# Patient Record
Sex: FEMALE | Race: WHITE | NOT HISPANIC OR LATINO | Employment: PART TIME | URBAN - METROPOLITAN AREA
[De-identification: names, ages, dates, MRNs, and addresses within clinical notes are randomized per-mention and may not be internally consistent; named-entity substitution may affect disease eponyms.]

---

## 2019-01-28 ENCOUNTER — TRANSCRIBE ORDERS (OUTPATIENT)
Dept: ADMINISTRATIVE | Facility: HOSPITAL | Age: 29
End: 2019-01-28

## 2019-01-28 ENCOUNTER — APPOINTMENT (OUTPATIENT)
Dept: LAB | Facility: HOSPITAL | Age: 29
End: 2019-01-28
Attending: PLASTIC SURGERY
Payer: COMMERCIAL

## 2019-01-28 DIAGNOSIS — Z01.818 PREOP EXAMINATION: Primary | ICD-10-CM

## 2019-01-28 DIAGNOSIS — Z01.818 PREOP EXAMINATION: ICD-10-CM

## 2019-01-28 LAB
APTT PPP: 29 SECONDS (ref 23–34)
BASOPHILS # BLD AUTO: 0 THOUSANDS/ΜL (ref 0–0.1)
BASOPHILS NFR BLD AUTO: 1 % (ref 0–1)
EOSINOPHIL # BLD AUTO: 0.1 THOUSAND/ΜL (ref 0–0.4)
EOSINOPHIL NFR BLD AUTO: 2 % (ref 0–6)
ERYTHROCYTE [DISTWIDTH] IN BLOOD BY AUTOMATED COUNT: 14.8 %
HCT VFR BLD AUTO: 36.9 % (ref 36–46)
HGB BLD-MCNC: 12.3 G/DL (ref 12–16)
INR PPP: 1.08 (ref 0.89–1.1)
LYMPHOCYTES # BLD AUTO: 2.7 THOUSANDS/ΜL (ref 0.5–4)
LYMPHOCYTES NFR BLD AUTO: 46 % (ref 25–45)
MCH RBC QN AUTO: 29.9 PG (ref 26–34)
MCHC RBC AUTO-ENTMCNC: 33.2 G/DL (ref 31–36)
MCV RBC AUTO: 90 FL (ref 80–100)
MONOCYTES # BLD AUTO: 0.5 THOUSAND/ΜL (ref 0.2–0.9)
MONOCYTES NFR BLD AUTO: 9 % (ref 1–10)
NEUTROPHILS # BLD AUTO: 2.5 THOUSANDS/ΜL (ref 1.8–7.8)
NEUTS SEG NFR BLD AUTO: 43 % (ref 45–65)
PLATELET # BLD AUTO: 184 THOUSANDS/UL (ref 150–450)
PMV BLD AUTO: 9 FL (ref 8.9–12.7)
PROTHROMBIN TIME: 11.4 SECONDS (ref 9.5–11.6)
RBC # BLD AUTO: 4.1 MILLION/UL (ref 4–5.2)
WBC # BLD AUTO: 5.9 THOUSAND/UL (ref 4.5–11)

## 2019-01-28 PROCEDURE — 36415 COLL VENOUS BLD VENIPUNCTURE: CPT

## 2019-01-28 PROCEDURE — 85025 COMPLETE CBC W/AUTO DIFF WBC: CPT

## 2019-01-28 PROCEDURE — 85730 THROMBOPLASTIN TIME PARTIAL: CPT

## 2019-01-28 PROCEDURE — 85610 PROTHROMBIN TIME: CPT

## 2019-02-08 RX ORDER — BUPROPION HYDROCHLORIDE 150 MG/1
150 TABLET, EXTENDED RELEASE ORAL 2 TIMES DAILY
COMMUNITY

## 2019-02-08 NOTE — PRE-PROCEDURE INSTRUCTIONS
Pre-Surgery Instructions:   Medication Instructions    buPROPion (WELLBUTRIN SR) 150 mg 12 hr tablet Instructed patient per Anesthesia Guidelines  Pre-op Showering Instructions for Surgery Patients    Before your operation, you play an important role in decreasing your risk for infection by washing with special antiseptic soap  This is an effective way to reduce bacteria on the skin which may help to prevent infections at the surgical site  Please read the following directions in advance  1  In the week before your operation, purchase a 4 ounce bottle of antiseptic soap containing chlorhexidine gluconate (CHG)  4%  Some brand names include: Aplicare®, Endure, and Hibiclens®  The cost is usually less than $5 00   For your convenience, the [email or web address] carries the soap   It may also be available at your doctors office or pre-admission testing center, and at most retail pharmacies   If you are allergic or sensitive to soaps containing CHG, please let your doctor know so another antiseptic can be suggested   CHG antiseptic soap is for external use only  2  The day before your operation, follow these instructions carefully to get ready   Please clean linens (sheets) on your bed; you should sleep on clean sheets after your evening shower   Get clean towels and washcloth ready - you need enough for 2 showers   Set aside clean underwear, pajamas, and clothing to wear after the showers     Reminders:   DO NOT use any other soap or body rinse on your skin during or after the antiseptic showers   DO NOT use lotion, powder, deodorant, or perfume/aftershave of any kind on your skin after your antiseptic shower   DO NOT shave any body parts in the 24 hours/day before your operation   DO NOT get the antiseptic soap in your eyes, ears, nose, mouth, or vaginal area    3   You will need to shower the night before AND the morning of your surgery  Shower 1:   The first evening before the operation, take the first shower   First, shampoo your hair with regular shampoo and rinse it completely before you use the antiseptic soap  After washing and rinsing your hair, rinse your body   Next, use a clean washcloth to apply the antiseptic soap and wash your body from the neck down to your toes using ½ bottle of the antiseptic soap   You will use the other ½ bottle for the second shower   Clean the area where your incision will be; lather this area well for about 2 minutes   If you are having head or neck surgery, wash areas with the antiseptic soap   Rinse yourself completely with running water   Use a clean towel to dry off   Wear clean underwear and clothing/pajamas  Shower 2   The morning of your operation, take the second shower following the same steps as Shower 1 using the second ½ of the bottle of antiseptic soap   Use clean cloths and towels to wash and dry yourself   Wear clean underwear and clothing

## 2019-02-08 NOTE — H&P
H&P Exam - Lester Cancer 34 y o  female MRN: [id number]    Unit/Bed#:  Encounter: [id number]    Assessment:  Lipodystrophy of the abdomen    Plan:  Abdominal plasty    History of Present Illness   This is a 81-year-old female has redundant skin of the abdomen and rectus diastasis patient also has tattoos of her abdomen patient understands had partial tattoo removal will be done with removal of the pannus    Review of Systems   All other systems reviewed and are negative  Historical Information   No past medical history on file  No past surgical history on file    Social History   History   Alcohol use Not on file     History   Drug use: Unknown     History   Smoking Status    Not on file   Smokeless Tobacco    Not on file     Family History: non-contributory    Meds/Allergies   all medications and allergies reviewed  Allergies not on file    Objective   First Vitals:        Current Vitals:        No intake or output data in the 24 hours ending 02/07/19 1905    Invasive Devices          No matching active lines, drains, or airways          Physical Exam examination of the head ears eyes nose and throat is within normal limits heart sounds S1-S2 are heard no murmurs or gallops lungs clear abdomen soft extremities are within normal limits    Lab Results:   Imaging:   EKG, Pathology, and Other Studies:     Code Status: No Order  Advance Directive and Living Will:      Power of :    POLST:      Counseling / Coordination of Care:   None

## 2019-02-11 ENCOUNTER — ANESTHESIA EVENT (OUTPATIENT)
Dept: PERIOP | Facility: HOSPITAL | Age: 29
End: 2019-02-11
Payer: SELF-PAY

## 2019-02-11 NOTE — ANESTHESIA PREPROCEDURE EVALUATION
Review of Systems/Medical History  Patient summary reviewed  Chart reviewed  No history of anesthetic complications     Cardiovascular  Negative cardio ROS Exercise tolerance (METS): >4,     Pulmonary  Not a smoker , No asthma , No sleep apnea ,        GI/Hepatic  Negative GI/hepatic ROS          Negative  ROS        Endo/Other  Negative endo/other ROS      GYN  Not currently pregnant , Prior pregnancy/OB history : 3 Parity: 3,          Hematology  Negative hematology ROS      Musculoskeletal  Negative musculoskeletal ROS        Neurology  Negative neurology ROS      Psychology   Negative psychology ROS Depression , being treated for depression,              Physical Exam    Airway    Mallampati score: II         Dental       Cardiovascular  Comment: Negative ROS, Cardiovascular exam normal    Pulmonary  Pulmonary exam normal     Other Findings  Fixed upper and lower teeth and in good repair      Anesthesia Plan  ASA Score- 2     Anesthesia Type- general with ASA Monitors  Additional Monitors:   Airway Plan:         Plan Factors-Patient not instructed to abstain from smoking on day of procedure  Patient did not smoke on day of surgery  Induction- intravenous  Postoperative Plan- Plan for postoperative opioid use  Informed Consent- Anesthetic plan and risks discussed with patient and spouse  I personally reviewed this patient with the CRNA  Discussed and agreed on the Anesthesia Plan with the CRNA  Rah Leung

## 2019-02-12 ENCOUNTER — ANESTHESIA (OUTPATIENT)
Dept: PERIOP | Facility: HOSPITAL | Age: 29
End: 2019-02-12
Payer: SELF-PAY

## 2019-02-12 ENCOUNTER — HOSPITAL ENCOUNTER (OUTPATIENT)
Facility: HOSPITAL | Age: 29
Setting detail: OBSERVATION
Discharge: HOME | End: 2019-02-13
Attending: PLASTIC SURGERY | Admitting: PLASTIC SURGERY
Payer: SELF-PAY

## 2019-02-12 DIAGNOSIS — E88.1 LIPODYSTROPHY: Primary | ICD-10-CM

## 2019-02-12 DIAGNOSIS — Z41.1 ENCOUNTER FOR COSMETIC SURGERY: ICD-10-CM

## 2019-02-12 LAB
BILIRUB UR QL STRIP: NEGATIVE
CLARITY UR: CLEAR
COLOR UR: NORMAL
EXT PREGNANCY TEST URINE: NEGATIVE
GLUCOSE UR STRIP-MCNC: NEGATIVE MG/DL
HGB UR QL STRIP.AUTO: NEGATIVE
KETONES UR STRIP-MCNC: NEGATIVE MG/DL
LEUKOCYTE ESTERASE UR QL STRIP: NEGATIVE
NITRITE UR QL STRIP: NEGATIVE
PH UR STRIP.AUTO: 6.5 [PH] (ref 4.5–8)
PROT UR STRIP-MCNC: NEGATIVE MG/DL
SP GR UR STRIP.AUTO: 1.01 (ref 1–1.04)
UROBILINOGEN UA: NEGATIVE MG/DL

## 2019-02-12 PROCEDURE — 81025 URINE PREGNANCY TEST: CPT | Performed by: PLASTIC SURGERY

## 2019-02-12 RX ORDER — ONDANSETRON 2 MG/ML
4 INJECTION INTRAMUSCULAR; INTRAVENOUS EVERY 8 HOURS PRN
Status: DISCONTINUED | OUTPATIENT
Start: 2019-02-12 | End: 2019-02-12

## 2019-02-12 RX ORDER — LIDOCAINE HYDROCHLORIDE 10 MG/ML
INJECTION, SOLUTION INFILTRATION; PERINEURAL AS NEEDED
Status: DISCONTINUED | OUTPATIENT
Start: 2019-02-12 | End: 2019-02-12 | Stop reason: SURG

## 2019-02-12 RX ORDER — FENTANYL CITRATE/PF 50 MCG/ML
25 SYRINGE (ML) INJECTION
Status: DISCONTINUED | OUTPATIENT
Start: 2019-02-12 | End: 2019-02-12 | Stop reason: HOSPADM

## 2019-02-12 RX ORDER — PROMETHAZINE HYDROCHLORIDE 25 MG/ML
25 INJECTION, SOLUTION INTRAMUSCULAR; INTRAVENOUS ONCE AS NEEDED
Status: DISCONTINUED | OUTPATIENT
Start: 2019-02-12 | End: 2019-02-12 | Stop reason: HOSPADM

## 2019-02-12 RX ORDER — DEXAMETHASONE SODIUM PHOSPHATE 4 MG/ML
INJECTION, SOLUTION INTRA-ARTICULAR; INTRALESIONAL; INTRAMUSCULAR; INTRAVENOUS; SOFT TISSUE AS NEEDED
Status: DISCONTINUED | OUTPATIENT
Start: 2019-02-12 | End: 2019-02-12 | Stop reason: SURG

## 2019-02-12 RX ORDER — FENTANYL CITRATE 50 UG/ML
INJECTION, SOLUTION INTRAMUSCULAR; INTRAVENOUS AS NEEDED
Status: DISCONTINUED | OUTPATIENT
Start: 2019-02-12 | End: 2019-02-12 | Stop reason: SURG

## 2019-02-12 RX ORDER — ONDANSETRON 2 MG/ML
4 INJECTION INTRAMUSCULAR; INTRAVENOUS EVERY 6 HOURS PRN
Status: DISCONTINUED | OUTPATIENT
Start: 2019-02-12 | End: 2019-02-13 | Stop reason: HOSPADM

## 2019-02-12 RX ORDER — CEFAZOLIN SODIUM 1 G/50ML
SOLUTION INTRAVENOUS AS NEEDED
Status: DISCONTINUED | OUTPATIENT
Start: 2019-02-12 | End: 2019-02-12 | Stop reason: SURG

## 2019-02-12 RX ORDER — HYDROMORPHONE HCL/PF 1 MG/ML
0.5 SYRINGE (ML) INJECTION EVERY 2 HOUR PRN
Status: DISCONTINUED | OUTPATIENT
Start: 2019-02-12 | End: 2019-02-13 | Stop reason: HOSPADM

## 2019-02-12 RX ORDER — ROCURONIUM BROMIDE 10 MG/ML
INJECTION, SOLUTION INTRAVENOUS AS NEEDED
Status: DISCONTINUED | OUTPATIENT
Start: 2019-02-12 | End: 2019-02-12 | Stop reason: SURG

## 2019-02-12 RX ORDER — LIDOCAINE HYDROCHLORIDE AND EPINEPHRINE 5; 5 MG/ML; UG/ML
INJECTION, SOLUTION INFILTRATION; PERINEURAL AS NEEDED
Status: DISCONTINUED | OUTPATIENT
Start: 2019-02-12 | End: 2019-02-12 | Stop reason: HOSPADM

## 2019-02-12 RX ORDER — CEFAZOLIN SODIUM 1 G/50ML
1000 SOLUTION INTRAVENOUS ONCE
Status: DISCONTINUED | OUTPATIENT
Start: 2019-02-12 | End: 2019-02-12 | Stop reason: HOSPADM

## 2019-02-12 RX ORDER — HYDROMORPHONE HCL/PF 1 MG/ML
0.5 SYRINGE (ML) INJECTION
Status: DISCONTINUED | OUTPATIENT
Start: 2019-02-12 | End: 2019-02-12 | Stop reason: HOSPADM

## 2019-02-12 RX ORDER — DEXTROSE MONOHYDRATE AND SODIUM CHLORIDE 5; .45 G/100ML; G/100ML
125 INJECTION, SOLUTION INTRAVENOUS CONTINUOUS
Status: DISCONTINUED | OUTPATIENT
Start: 2019-02-12 | End: 2019-02-13 | Stop reason: HOSPADM

## 2019-02-12 RX ORDER — CEFAZOLIN SODIUM 1 G/50ML
1000 SOLUTION INTRAVENOUS EVERY 8 HOURS
Status: DISCONTINUED | OUTPATIENT
Start: 2019-02-12 | End: 2019-02-12

## 2019-02-12 RX ORDER — ONDANSETRON 2 MG/ML
INJECTION INTRAMUSCULAR; INTRAVENOUS AS NEEDED
Status: DISCONTINUED | OUTPATIENT
Start: 2019-02-12 | End: 2019-02-12 | Stop reason: SURG

## 2019-02-12 RX ORDER — SODIUM CHLORIDE, SODIUM LACTATE, POTASSIUM CHLORIDE, CALCIUM CHLORIDE 600; 310; 30; 20 MG/100ML; MG/100ML; MG/100ML; MG/100ML
75 INJECTION, SOLUTION INTRAVENOUS CONTINUOUS
Status: DISCONTINUED | OUTPATIENT
Start: 2019-02-12 | End: 2019-02-13 | Stop reason: HOSPADM

## 2019-02-12 RX ORDER — MAGNESIUM HYDROXIDE 1200 MG/15ML
LIQUID ORAL AS NEEDED
Status: DISCONTINUED | OUTPATIENT
Start: 2019-02-12 | End: 2019-02-12 | Stop reason: HOSPADM

## 2019-02-12 RX ORDER — PROPOFOL 10 MG/ML
INJECTION, EMULSION INTRAVENOUS AS NEEDED
Status: DISCONTINUED | OUTPATIENT
Start: 2019-02-12 | End: 2019-02-12 | Stop reason: SURG

## 2019-02-12 RX ORDER — ONDANSETRON 2 MG/ML
4 INJECTION INTRAMUSCULAR; INTRAVENOUS ONCE AS NEEDED
Status: COMPLETED | OUTPATIENT
Start: 2019-02-12 | End: 2019-02-12

## 2019-02-12 RX ORDER — METOCLOPRAMIDE HYDROCHLORIDE 5 MG/ML
10 INJECTION INTRAMUSCULAR; INTRAVENOUS ONCE
Status: COMPLETED | OUTPATIENT
Start: 2019-02-12 | End: 2019-02-12

## 2019-02-12 RX ORDER — MIDAZOLAM HYDROCHLORIDE 1 MG/ML
INJECTION INTRAMUSCULAR; INTRAVENOUS AS NEEDED
Status: DISCONTINUED | OUTPATIENT
Start: 2019-02-12 | End: 2019-02-12 | Stop reason: SURG

## 2019-02-12 RX ORDER — DIPHENHYDRAMINE HYDROCHLORIDE 50 MG/ML
25 INJECTION INTRAMUSCULAR; INTRAVENOUS EVERY 6 HOURS PRN
Status: DISCONTINUED | OUTPATIENT
Start: 2019-02-12 | End: 2019-02-13 | Stop reason: HOSPADM

## 2019-02-12 RX ORDER — NEOSTIGMINE METHYLSULFATE 1 MG/ML
INJECTION INTRAVENOUS AS NEEDED
Status: DISCONTINUED | OUTPATIENT
Start: 2019-02-12 | End: 2019-02-12 | Stop reason: SURG

## 2019-02-12 RX ORDER — ONDANSETRON 2 MG/ML
4 INJECTION INTRAMUSCULAR; INTRAVENOUS ONCE AS NEEDED
Status: DISCONTINUED | OUTPATIENT
Start: 2019-02-12 | End: 2019-02-12 | Stop reason: HOSPADM

## 2019-02-12 RX ORDER — SODIUM CHLORIDE, SODIUM LACTATE, POTASSIUM CHLORIDE, CALCIUM CHLORIDE 600; 310; 30; 20 MG/100ML; MG/100ML; MG/100ML; MG/100ML
125 INJECTION, SOLUTION INTRAVENOUS CONTINUOUS
Status: CANCELLED | OUTPATIENT
Start: 2019-02-12

## 2019-02-12 RX ORDER — OXYCODONE HYDROCHLORIDE AND ACETAMINOPHEN 5; 325 MG/1; MG/1
1 TABLET ORAL EVERY 4 HOURS PRN
Status: DISCONTINUED | OUTPATIENT
Start: 2019-02-12 | End: 2019-02-13 | Stop reason: HOSPADM

## 2019-02-12 RX ORDER — SODIUM CHLORIDE, SODIUM LACTATE, POTASSIUM CHLORIDE, CALCIUM CHLORIDE 600; 310; 30; 20 MG/100ML; MG/100ML; MG/100ML; MG/100ML
125 INJECTION, SOLUTION INTRAVENOUS CONTINUOUS
Status: DISCONTINUED | OUTPATIENT
Start: 2019-02-12 | End: 2019-02-12

## 2019-02-12 RX ORDER — CEFAZOLIN SODIUM 1 G/50ML
1000 SOLUTION INTRAVENOUS EVERY 8 HOURS
Status: COMPLETED | OUTPATIENT
Start: 2019-02-12 | End: 2019-02-13

## 2019-02-12 RX ORDER — HYDROMORPHONE HCL 110MG/55ML
PATIENT CONTROLLED ANALGESIA SYRINGE INTRAVENOUS AS NEEDED
Status: DISCONTINUED | OUTPATIENT
Start: 2019-02-12 | End: 2019-02-12 | Stop reason: SURG

## 2019-02-12 RX ORDER — GLYCOPYRROLATE 0.2 MG/ML
INJECTION INTRAMUSCULAR; INTRAVENOUS AS NEEDED
Status: DISCONTINUED | OUTPATIENT
Start: 2019-02-12 | End: 2019-02-12 | Stop reason: SURG

## 2019-02-12 RX ADMIN — FENTANYL CITRATE 50 MCG: 50 INJECTION INTRAMUSCULAR; INTRAVENOUS at 11:23

## 2019-02-12 RX ADMIN — LIDOCAINE HYDROCHLORIDE 50 MG: 10 INJECTION, SOLUTION INFILTRATION; PERINEURAL at 08:11

## 2019-02-12 RX ADMIN — PROPOFOL 200 MG: 10 INJECTION, EMULSION INTRAVENOUS at 08:11

## 2019-02-12 RX ADMIN — MIDAZOLAM HYDROCHLORIDE 2 MG: 1 INJECTION, SOLUTION INTRAMUSCULAR; INTRAVENOUS at 08:06

## 2019-02-12 RX ADMIN — FENTANYL CITRATE 100 MCG: 50 INJECTION INTRAMUSCULAR; INTRAVENOUS at 08:11

## 2019-02-12 RX ADMIN — ROCURONIUM BROMIDE 10 MG: 10 INJECTION INTRAVENOUS at 09:34

## 2019-02-12 RX ADMIN — DEXTROSE AND SODIUM CHLORIDE 125 ML/HR: 5; 450 INJECTION, SOLUTION INTRAVENOUS at 21:28

## 2019-02-12 RX ADMIN — OXYCODONE HYDROCHLORIDE AND ACETAMINOPHEN 1 TABLET: 5; 325 TABLET ORAL at 23:44

## 2019-02-12 RX ADMIN — ROCURONIUM BROMIDE 10 MG: 10 INJECTION INTRAVENOUS at 09:46

## 2019-02-12 RX ADMIN — HYDROMORPHONE HYDROCHLORIDE 1 MG: 2 INJECTION, SOLUTION INTRAMUSCULAR; INTRAVENOUS; SUBCUTANEOUS at 11:55

## 2019-02-12 RX ADMIN — ROCURONIUM BROMIDE 50 MG: 10 INJECTION INTRAVENOUS at 08:11

## 2019-02-12 RX ADMIN — LIDOCAINE HYDROCHLORIDE 1 APPLICATION: 20 JELLY TOPICAL at 08:14

## 2019-02-12 RX ADMIN — FENTANYL CITRATE 50 MCG: 50 INJECTION INTRAMUSCULAR; INTRAVENOUS at 08:06

## 2019-02-12 RX ADMIN — SODIUM CHLORIDE, SODIUM LACTATE, POTASSIUM CHLORIDE, AND CALCIUM CHLORIDE: .6; .31; .03; .02 INJECTION, SOLUTION INTRAVENOUS at 07:45

## 2019-02-12 RX ADMIN — METOCLOPRAMIDE 10 MG: 5 INJECTION, SOLUTION INTRAMUSCULAR; INTRAVENOUS at 13:02

## 2019-02-12 RX ADMIN — ROCURONIUM BROMIDE 10 MG: 10 INJECTION INTRAVENOUS at 10:32

## 2019-02-12 RX ADMIN — CEFAZOLIN SODIUM 1000 MG: 1 SOLUTION INTRAVENOUS at 17:27

## 2019-02-12 RX ADMIN — ONDANSETRON HYDROCHLORIDE 4 MG: 2 INJECTION, SOLUTION INTRAMUSCULAR; INTRAVENOUS at 18:39

## 2019-02-12 RX ADMIN — FENTANYL CITRATE 50 MCG: 50 INJECTION INTRAMUSCULAR; INTRAVENOUS at 10:28

## 2019-02-12 RX ADMIN — DEXAMETHASONE SODIUM PHOSPHATE 4 MG: 4 INJECTION, SOLUTION INTRA-ARTICULAR; INTRALESIONAL; INTRAMUSCULAR; INTRAVENOUS; SOFT TISSUE at 08:45

## 2019-02-12 RX ADMIN — SODIUM CHLORIDE, SODIUM LACTATE, POTASSIUM CHLORIDE, AND CALCIUM CHLORIDE: .6; .31; .03; .02 INJECTION, SOLUTION INTRAVENOUS at 10:00

## 2019-02-12 RX ADMIN — ONDANSETRON HYDROCHLORIDE 4 MG: 2 INJECTION, SOLUTION INTRAMUSCULAR; INTRAVENOUS at 23:40

## 2019-02-12 RX ADMIN — ROCURONIUM BROMIDE 10 MG: 10 INJECTION INTRAVENOUS at 09:54

## 2019-02-12 RX ADMIN — ONDANSETRON 4 MG: 2 INJECTION INTRAMUSCULAR; INTRAVENOUS at 13:01

## 2019-02-12 RX ADMIN — CEFAZOLIN SODIUM 1000 MG: 1 SOLUTION INTRAVENOUS at 08:28

## 2019-02-12 RX ADMIN — NEOSTIGMINE METHYLSULFATE 4 MG: 1 INJECTION INTRAVENOUS at 11:45

## 2019-02-12 RX ADMIN — DEXTROSE AND SODIUM CHLORIDE 125 ML/HR: 5; 450 INJECTION, SOLUTION INTRAVENOUS at 12:39

## 2019-02-12 RX ADMIN — HYDROMORPHONE HYDROCHLORIDE 1 MG: 2 INJECTION, SOLUTION INTRAMUSCULAR; INTRAVENOUS; SUBCUTANEOUS at 11:38

## 2019-02-12 RX ADMIN — GLYCOPYRROLATE 0.4 MG: 0.2 INJECTION, SOLUTION INTRAMUSCULAR; INTRAVENOUS at 11:45

## 2019-02-12 RX ADMIN — ONDANSETRON HYDROCHLORIDE 4 MG: 2 INJECTION, SOLUTION INTRAMUSCULAR; INTRAVENOUS at 11:40

## 2019-02-12 RX ADMIN — DIPHENHYDRAMINE HYDROCHLORIDE 25 MG: 50 INJECTION INTRAMUSCULAR; INTRAVENOUS at 23:40

## 2019-02-12 RX ADMIN — DIPHENHYDRAMINE HYDROCHLORIDE 25 MG: 50 INJECTION INTRAMUSCULAR; INTRAVENOUS at 17:26

## 2019-02-12 NOTE — NURSING NOTE
Surgical garment put in place properly  Garment was put on with clips against patient's skin  Garment changed to inside out and put in place with help of GURVINDER Serra

## 2019-02-12 NOTE — NURSING NOTE
Patient received from PACU into 513  Patient is drowsy but responds to name and is orientated when woken up  No complaints of pain  Mild ABD tenderness with palpation  +BS  No shortness of breath, no chest pain  Lungs CTA  Abdominal binder in place  IVF infusing  SCDs in place  Call bell in reach, will monitor

## 2019-02-12 NOTE — OP NOTE
OPERATIVE REPORT  PATIENT NAME: Marcus Lepe    :  1990  MRN: [id number]  Pt Location:  OR ROOM 03    SURGERY DATE: 2019    Surgeon(s) and Role:     * Teresita Mcknight MD - Primary     * GUSTAVO Natarajan - Assisting    Preop Diagnosis:  Encounter for cosmetic surgery [Z41 1]    Post-Op Diagnosis Codes:     * Encounter for cosmetic surgery [Z41 1]    Procedure(s) (LRB):  ABDOMINOPLASTY (N/A)    Specimen(s):  ID Type Source Tests Collected by Time Destination   A :  Urine Urine, Indwelling Biswas Catheter URINALYSIS WITH REFLEX TO MICROSCOPIC Teresita Mcknight MD 2019 1024        Estimated Blood Loss:   Minimal    Drains:  Closed/Suction Drain Right;Lateral Abdomen Bulb 10 Fr  (Active)   Number of days: 0       Urethral Catheter Latex 16 Fr  (Active)   Number of days: 0       Anesthesia Type:   General    Operative Indications:  Encounter for cosmetic surgery [Z41 1]  Excessive abdominal fat of abdomen    Operative Findings:  Normal    Complications:   None    Procedure and Technique:  Patient was marked in the holding area draped and prepped in the normal sterile fashion after general endotracheal anesthesia tumescent liposuction was used to contour the waist in the premarked area was excised this was undermined up to the rib margin midline was plicated with 0 Tycron sutures in a figure-eight fashion the excess abdominal pannus was removed and 10   Channel drain was placed through a separate stab wound in the flank anchored in place with 3-0 nylon suture the umbilicus was transposed to the midline anchored in place with 4-0 Vicryl buried sutures and half buried 5 0 nylon mattress sutures the flap was then approximated with oa Tycron sutures for fascia with 2-0 Polysorb for deep dermis and a running subcuticular 3-0 Surgipro suture for skin abdominal compression garment was placed   I was present for the entire procedure    Patient Disposition:  PACU     SIGNATURE: Teresita Mcknight MD  DATE: February 12, 2019  TIME: 11:50 AM

## 2019-02-12 NOTE — PLAN OF CARE
Problem: Prexisting or High Potential for Compromised Skin Integrity  Goal: Skin integrity is maintained or improved  Description  INTERVENTIONS:  - Identify patients at risk for skin breakdown  - Assess and monitor skin integrity  - Assess and monitor nutrition and hydration status  - Monitor labs (i e  albumin)  - Assess for incontinence   - Turn and reposition patient  - Assist with mobility/ambulation  - Relieve pressure over bony prominences  - Avoid friction and shearing  - Provide appropriate hygiene as needed including keeping skin clean and dry  - Evaluate need for skin moisturizer/barrier cream  - Collaborate with interdisciplinary team (i e  Nutrition, Rehabilitation, etc )   - Patient/family teaching  Outcome: Progressing     Problem: PAIN - ADULT  Goal: Verbalizes/displays adequate comfort level or baseline comfort level  Description  Interventions:  - Encourage patient to monitor pain and request assistance  - Assess pain using appropriate pain scale  - Administer analgesics based on type and severity of pain and evaluate response  - Implement non-pharmacological measures as appropriate and evaluate response  - Consider cultural and social influences on pain and pain management  - Notify physician/advanced practitioner if interventions unsuccessful or patient reports new pain  Outcome: Progressing     Problem: INFECTION - ADULT  Goal: Absence or prevention of progression during hospitalization  Description  INTERVENTIONS:  - Assess and monitor for signs and symptoms of infection  - Monitor lab/diagnostic results  - Monitor all insertion sites, i e  indwelling lines, tubes, and drains  - Monitor endotracheal (as able) and nasal secretions for changes in amount and color  - [place] appropriate cooling/warming therapies per order  - Administer medications as ordered  - Instruct and encourage patient and family to use good hand hygiene technique  - Identify and instruct in appropriate isolation precautions for identified infection/condition  Outcome: Progressing  Goal: Absence of fever/infection during neutropenic period  Description  INTERVENTIONS:  - Monitor WBC  - Implement neutropenic guidelines  Outcome: Progressing     Problem: SAFETY ADULT  Goal: Patient will remain free of falls  Description  INTERVENTIONS:  - Assess patient frequently for physical needs  -  Identify cognitive and physical deficits and behaviors that affect risk of falls    -  [place] fall precautions as indicated by assessment   - Educate patient/family on patient safety including physical limitations  - Instruct patient to call for assistance with activity based on assessment  - Modify environment to reduce risk of injury  - Consider OT/PT consult to assist with strengthening/mobility  Outcome: Progressing  Goal: Maintain or return to baseline ADL function  Description  INTERVENTIONS:  -  Assess patient's ability to carry out ADLs; assess patient's baseline for ADL function and identify physical deficits which impact ability to perform ADLs (bathing, care of mouth/teeth, toileting, grooming, dressing, etc )  - Assess/evaluate cause of self-care deficits   - Assess range of motion  - Assess patient's mobility; develop plan if impaired  - Assess patient's need for assistive devices and provide as appropriate  - Encourage maximum independence but intervene and supervise when necessary  ¯ Involve family in performance of ADLs  ¯ Assess for home care needs following discharge   ¯ Request OT consult to assist with ADL evaluation and planning for discharge  ¯ Provide patient education as appropriate  Outcome: Progressing  Goal: Maintain or return mobility status to optimal level  Description  INTERVENTIONS:  - Assess patient's baseline mobility status (ambulation, transfers, stairs, etc )    - Identify cognitive and physical deficits and behaviors that affect mobility  - Identify mobility aids required to assist with transfers and/or ambulation (gait belt, sit-to-stand, lift, walker, cane, etc )  - [place] fall precautions as indicated by assessment  - Record patient progress and toleration of activity level on Mobility SBAR; progress patient to next Phase/Stage  - Instruct patient to call for assistance with activity based on assessment  - Request Rehabilitation consult to assist with strengthening/weightbearing, etc   Outcome: Progressing     Problem: DISCHARGE PLANNING  Goal: Discharge to home or other facility with appropriate resources  Description  INTERVENTIONS:  - Identify barriers to discharge w/patient and caregiver  - Arrange for needed discharge resources and transportation as appropriate  - Identify discharge learning needs (meds, wound care, etc )  - Arrange for interpretive services to assist at discharge as needed  - Refer to Case Management Department for coordinating discharge planning if the patient needs post-hospital services based on physician/advanced practitioner order or complex needs related to functional status, cognitive ability, or social support system  Outcome: Progressing     Problem: Knowledge Deficit  Goal: Patient/family/caregiver demonstrates understanding of disease process, treatment plan, medications, and discharge instructions  Description  Complete learning assessment and assess knowledge base    Interventions:  - Provide teaching at level of understanding  - Provide teaching via preferred learning methods  Outcome: Progressing

## 2019-02-12 NOTE — PERIOPERATIVE NURSING NOTE
Nauseated earlier, scant amount of vicious, clear, pink tinged mucous expectorated  Pain in abd only with heaving motion  Cushion provided for abd  Sleeping quietly at this time  Will continue to monitor

## 2019-02-12 NOTE — ANESTHESIA POSTPROCEDURE EVALUATION
Post-Op Assessment Note    CV Status:  Stable    Pain management: adequate     Mental Status:  Alert and awake   Hydration Status:  Euvolemic   PONV Controlled:  Controlled   Airway Patency:  Patent   Post Op Vitals Reviewed: Yes      Staff: CRNA           BP      Temp      Pulse     Resp      SpO2

## 2019-02-13 VITALS
TEMPERATURE: 98.4 F | OXYGEN SATURATION: 98 % | RESPIRATION RATE: 16 BRPM | DIASTOLIC BLOOD PRESSURE: 65 MMHG | HEART RATE: 80 BPM | SYSTOLIC BLOOD PRESSURE: 108 MMHG | BODY MASS INDEX: 25.52 KG/M2 | WEIGHT: 130 LBS | HEIGHT: 60 IN

## 2019-02-13 PROBLEM — E88.1 LIPODYSTROPHY: Status: ACTIVE | Noted: 2019-02-13

## 2019-02-13 RX ORDER — CEPHALEXIN 500 MG/1
500 CAPSULE ORAL EVERY 8 HOURS SCHEDULED
Qty: 21 CAPSULE | Refills: 0 | Status: SHIPPED | OUTPATIENT
Start: 2019-02-13 | End: 2019-02-20

## 2019-02-13 RX ORDER — OXYCODONE HYDROCHLORIDE AND ACETAMINOPHEN 5; 325 MG/1; MG/1
1 TABLET ORAL EVERY 4 HOURS PRN
Qty: 30 TABLET | Refills: 0 | Status: SHIPPED | OUTPATIENT
Start: 2019-02-13

## 2019-02-13 RX ADMIN — OXYCODONE HYDROCHLORIDE AND ACETAMINOPHEN 1 TABLET: 5; 325 TABLET ORAL at 08:04

## 2019-02-13 RX ADMIN — OXYCODONE HYDROCHLORIDE AND ACETAMINOPHEN 1 TABLET: 5; 325 TABLET ORAL at 13:43

## 2019-02-13 RX ADMIN — DEXTROSE AND SODIUM CHLORIDE 125 ML/HR: 5; 450 INJECTION, SOLUTION INTRAVENOUS at 10:12

## 2019-02-13 RX ADMIN — CEFAZOLIN SODIUM 1000 MG: 1 SOLUTION INTRAVENOUS at 03:02

## 2019-02-13 RX ADMIN — OXYCODONE HYDROCHLORIDE AND ACETAMINOPHEN 1 TABLET: 5; 325 TABLET ORAL at 18:08

## 2019-02-13 RX ADMIN — DEXTROSE AND SODIUM CHLORIDE 125 ML/HR: 5; 450 INJECTION, SOLUTION INTRAVENOUS at 01:39

## 2019-02-13 NOTE — SOCIAL WORK
SW spoke with this pt this morning  OBS form presented, signed by pt, and pt was given a copy  Pt is fully independent, working, and resides with her  in their home  Her PCP is Kendra Vasquez  Pt underwent cosmetic surgery with Dr Hola Feliz and should be discharged home this evening with no further needs

## 2019-02-13 NOTE — UTILIZATION REVIEW
Initial Clinical Review ELECTIVE SURGERY  OP 96202 PT IS SELF PAY FULL COSMETIC SEE BENEFIT COLLECTIONS    Age/Sex: 34 y o  female     Surgery Date: 2/12/19    Procedure: ABDOMINOPLASTY     Anesthesia: General    Admission Orders: Date/Time/Statement: 2/12/19 @ 7760 OBSERVATION   Orders Placed This Encounter   Procedures    Place in Observation     Standing Status:   Standing     Number of Occurrences:   1     Order Specific Question:   Admitting Physician     Answer:   Remedios Bird [[id number]]     Order Specific Question:   Level of Care     Answer:   Med Surg [16]     Vital Signs:   02/13/19 0733 98 1 °F (36 7 °C) 78 18 101/47 99 % None (Room air)   02/12/19 2352 98 9 °F (37 2 °C) 89 18 109/53 100 % None (Room air)   02/12/19 1204 98 4 °F (36 9 °C) 74 17 124/60 100 % --     Diet:        Diet Orders   (From admission, onward)            Start     Ordered    02/12/19 1221  Diet Regular; Regular House  Diet effective now     Question Answer Comment   Diet Type Regular    Regular Regular House    RD to adjust diet per protocol? Yes        02/12/19 1220        Mobility:   HOB [facility]    DVT Prophylaxis:   SCDs    Pain Control:   Scheduled Meds:  Current Facility-Administered Medications:  dextrose 5 % and sodium chloride 0 45 % 125 mL/hr Intravenous Continuous   diphenhydrAMINE 25 mg Intravenous Q6H PRN x2   HYDROmorphone 0 5 mg Intravenous Q2H PRN   lactated ringers 75 mL/hr Intravenous Continuous   ondansetron 4 mg Intravenous Q6H PRN x2   oxyCODONE-acetaminophen 1 tablet Oral Q4H PRN x2     Network Utilization Review Department  Phone: [phone]; Fax [phone]  [email or web address]  ATTENTION: Please call with any questions or concerns to [phone]  and carefully listen to the prompts so that you are directed to the right person  Send all requests for admission clinical reviews, approved or denied determinations and any other requests to fax [phone]   All voicemails are confidential

## 2019-02-13 NOTE — NURSING NOTE
Pt awake and alert  C/o 4 out of 10 in bed and 5 out of 10 up to bathroom  Pt medicated  Output 400cc  BS clear  HR regular  Call bell within reach

## 2019-02-13 NOTE — NURSING NOTE
Pt is reporting persistent nausea/vomitting not relieved by zofran  Dr Marsha Marsh paged  Will continue to monitor

## 2023-02-01 ENCOUNTER — TELEPHONE (OUTPATIENT)
Dept: PSYCHIATRY | Facility: CLINIC | Age: 33
End: 2023-02-01

## 2023-02-01 NOTE — TELEPHONE ENCOUNTER
Client called to request therapy   Writer advised that there is a waitlist  Added client to waitlist

## 2023-04-26 ENCOUNTER — TELEPHONE (OUTPATIENT)
Dept: PSYCHIATRY | Facility: CLINIC | Age: 33
End: 2023-04-26

## 2023-04-26 NOTE — TELEPHONE ENCOUNTER
"Behavioral Health Outpatient Intake Questions    Referred By   : insurance    Please advise interviewee that they need to answer all questions truthfully to allow for best care, and any misrepresentations of information may affect their ability to be seen at this clinic   => Was this discussed? Yes     If Minor Child (under age 25)    Who is/are the legal guardian(s) of the child? Is there a custody agreement? No     • If \"YES\"- Custody orders must be obtained prior to scheduling the first appointment  • In addition, Consent to Treatment must be signed by all legal guardians prior to scheduling the first appointment    • If \"NO\"- Consent to Treatment must be signed by all legal guardians prior to scheduling the first appointment    [address] History -     Presenting Problem (in patient's own words): have never been to therapy before  Life events have brought me to seek it out  I left my  of 12 years in January and my 15year old is having a mental health crisis  Are there any communication barriers for this patient? No                                               If yes, please describe barriers:   • If there is a unique situation, please refer to [address] [place] [address] for final determination  Are you taking any psychiatric medications? Yes   •   If \"YES\" -What are they Wellbutrin   •   If \"YES\" -Who prescribes? PCP    Has the Patient previously received outpatient Talk Therapy or Medication Management from [facility]  No     •    If \"YES\"- When, Where and with Whom? •    If \"NO\" -Has Patient received these services elsewhere? •   If \"YES\" -When, Where, and with Whom? Has the Patient abused alcohol or other substances in the last 6 months ? Yes  There is a documented history of marijuana abuse confirmed by the patient  •  If \"YES\" -What substance, How much, How often?  Smoke marijuana everyday and do not have a card but I need to get one    •  If " "illegal substance: Refer to [facility] (for POP) or [facility]  •  If Alcohol in excess of 10 drinks per week:  Refer to [facility] (for POP) or [address] [place] [address] Street History-     Is this treatment court ordered? No   • If \"Yes\"- refer to [address] [place] [address] for final determination  Has the Patient been convicted of a felony? No  •  If \"Yes\" -When, What? • Talk Therapy : Send to [address] [place] [address] for final determination   • Med Management: Send to Dr Alvino Reed for final determination     ACCEPTED as a patient Yes  • If \"Yes\" Appointment Date: 5/3 @ 1pm with Aerial Burton-Waite    Referred Elsewhere? No  • If “Yes” - (Where? Ex: Chris Clyde, JASEN/COURTNEY, [address] [place] [address], etc )       Name of Insurance Co: [address] [place] [address] ID# [id number]  Insurance Phone # [phone]  If ins is primary or secondary? primary  If patient is a minor, parents information such as Name, D  O B of guarantor    "

## 2023-05-18 ENCOUNTER — TELEPHONE (OUTPATIENT)
Dept: PSYCHIATRY | Facility: CLINIC | Age: 33
End: 2023-05-18

## 2023-05-18 NOTE — TELEPHONE ENCOUNTER
Called pt in regards to cancelled therapy anywhere sessions  Writer was reaching out to see if pt was looking for in person session instead  Voicemail box was full and could not leave message

## 2025-01-12 ENCOUNTER — HOSPITAL ENCOUNTER (EMERGENCY)
Facility: HOSPITAL | Age: 35
Discharge: HOME | End: 2025-01-12
Attending: EMERGENCY MEDICINE | Admitting: EMERGENCY MEDICINE
Payer: COMMERCIAL

## 2025-01-12 ENCOUNTER — APPOINTMENT (EMERGENCY)
Dept: ULTRASOUND IMAGING | Facility: HOSPITAL | Age: 35
End: 2025-01-12
Payer: COMMERCIAL

## 2025-01-12 ENCOUNTER — NURSE TRIAGE (OUTPATIENT)
Dept: OTHER | Facility: OTHER | Age: 35
End: 2025-01-12

## 2025-01-12 VITALS
SYSTOLIC BLOOD PRESSURE: 96 MMHG | WEIGHT: 140 LBS | TEMPERATURE: 98 F | RESPIRATION RATE: 17 BRPM | HEIGHT: 68 IN | HEART RATE: 74 BPM | BODY MASS INDEX: 21.22 KG/M2 | DIASTOLIC BLOOD PRESSURE: 54 MMHG | OXYGEN SATURATION: 99 %

## 2025-01-12 DIAGNOSIS — N83.209 OVARIAN CYST: ICD-10-CM

## 2025-01-12 DIAGNOSIS — O21.9 NAUSEA AND VOMITING DURING PREGNANCY: Primary | ICD-10-CM

## 2025-01-12 DIAGNOSIS — R82.71 ASYMPTOMATIC BACTERIURIA: ICD-10-CM

## 2025-01-12 DIAGNOSIS — R55 NEAR SYNCOPE: ICD-10-CM

## 2025-01-12 LAB
ALBUMIN SERPL BCG-MCNC: 4.1 G/DL (ref 3.5–5)
ALP SERPL-CCNC: 23 U/L (ref 34–104)
ALT SERPL W P-5'-P-CCNC: 10 U/L (ref 7–52)
AMORPH URATE CRY URNS QL MICRO: ABNORMAL
ANION GAP SERPL CALCULATED.3IONS-SCNC: 5 MMOL/L (ref 4–13)
AST SERPL W P-5'-P-CCNC: 10 U/L (ref 13–39)
ATRIAL RATE: 69 BPM
B-HCG SERPL-ACNC: ABNORMAL MIU/ML (ref 0–5)
BACTERIA UR QL AUTO: ABNORMAL /HPF
BASOPHILS # BLD AUTO: 0.03 THOUSANDS/ΜL (ref 0–0.1)
BASOPHILS NFR BLD AUTO: 0 % (ref 0–1)
BILIRUB SERPL-MCNC: 0.37 MG/DL (ref 0.2–1)
BILIRUB UR QL STRIP: NEGATIVE
BUN SERPL-MCNC: 9 MG/DL (ref 5–25)
CALCIUM SERPL-MCNC: 8.8 MG/DL (ref 8.4–10.2)
CARDIAC TROPONIN I PNL SERPL HS: <2 NG/L (ref ?–50)
CARDIAC TROPONIN I PNL SERPL HS: <2 NG/L (ref ?–50)
CHLORIDE SERPL-SCNC: 105 MMOL/L (ref 96–108)
CLARITY UR: ABNORMAL
CO2 SERPL-SCNC: 27 MMOL/L (ref 21–32)
COLOR UR: YELLOW
CREAT SERPL-MCNC: 0.46 MG/DL (ref 0.6–1.3)
EOSINOPHIL # BLD AUTO: 0.06 THOUSAND/ΜL (ref 0–0.61)
EOSINOPHIL NFR BLD AUTO: 1 % (ref 0–6)
ERYTHROCYTE [DISTWIDTH] IN BLOOD BY AUTOMATED COUNT: 12.2 % (ref 11.6–15.1)
FLUAV RNA RESP QL NAA+PROBE: NEGATIVE
FLUBV RNA RESP QL NAA+PROBE: NEGATIVE
GFR SERPL CREATININE-BSD FRML MDRD: 132 ML/MIN/1.73SQ M
GLUCOSE SERPL-MCNC: 80 MG/DL (ref 65–140)
GLUCOSE UR STRIP-MCNC: NEGATIVE MG/DL
GRAN CASTS #/AREA URNS LPF: ABNORMAL /[LPF]
HCT VFR BLD AUTO: 35.7 % (ref 34.8–46.1)
HGB BLD-MCNC: 12.1 G/DL (ref 11.5–15.4)
HGB UR QL STRIP.AUTO: NEGATIVE
IMM GRANULOCYTES # BLD AUTO: 0.09 THOUSAND/UL (ref 0–0.2)
IMM GRANULOCYTES NFR BLD AUTO: 1 % (ref 0–2)
KETONES UR STRIP-MCNC: NEGATIVE MG/DL
LEUKOCYTE ESTERASE UR QL STRIP: ABNORMAL
LYMPHOCYTES # BLD AUTO: 1.32 THOUSANDS/ΜL (ref 0.6–4.47)
LYMPHOCYTES NFR BLD AUTO: 14 % (ref 14–44)
MAGNESIUM SERPL-MCNC: 1.9 MG/DL (ref 1.9–2.7)
MCH RBC QN AUTO: 30.2 PG (ref 26.8–34.3)
MCHC RBC AUTO-ENTMCNC: 33.9 G/DL (ref 31.4–37.4)
MCV RBC AUTO: 89 FL (ref 82–98)
MONOCYTES # BLD AUTO: 0.53 THOUSAND/ΜL (ref 0.17–1.22)
MONOCYTES NFR BLD AUTO: 5 % (ref 4–12)
MUCOUS THREADS UR QL AUTO: ABNORMAL
NEUTROPHILS # BLD AUTO: 7.72 THOUSANDS/ΜL (ref 1.85–7.62)
NEUTS SEG NFR BLD AUTO: 79 % (ref 43–75)
NITRITE UR QL STRIP: NEGATIVE
NON-SQ EPI CELLS URNS QL MICRO: ABNORMAL /HPF
NRBC BLD AUTO-RTO: 0 /100 WBCS
P AXIS: 63 DEGREES
PH UR STRIP.AUTO: 7.5 [PH]
PLATELET # BLD AUTO: 194 THOUSANDS/UL (ref 149–390)
PMV BLD AUTO: 11.3 FL (ref 8.9–12.7)
POTASSIUM SERPL-SCNC: 4.3 MMOL/L (ref 3.5–5.3)
PR INTERVAL: 142 MS
PROT SERPL-MCNC: 6.7 G/DL (ref 6.4–8.4)
PROT UR STRIP-MCNC: NEGATIVE MG/DL
QRS AXIS: 59 DEGREES
QRSD INTERVAL: 98 MS
QT INTERVAL: 398 MS
QTC INTERVAL: 427 MS
RBC # BLD AUTO: 4.01 MILLION/UL (ref 3.81–5.12)
RBC #/AREA URNS AUTO: ABNORMAL /HPF
RSV RNA RESP QL NAA+PROBE: NEGATIVE
SARS-COV-2 RNA RESP QL NAA+PROBE: NEGATIVE
SODIUM SERPL-SCNC: 137 MMOL/L (ref 135–147)
SP GR UR STRIP.AUTO: 1.02 (ref 1–1.03)
T WAVE AXIS: 51 DEGREES
UROBILINOGEN UR STRIP-ACNC: <2 MG/DL
VENTRICULAR RATE: 69 BPM
WBC # BLD AUTO: 9.75 THOUSAND/UL (ref 4.31–10.16)
WBC #/AREA URNS AUTO: ABNORMAL /HPF

## 2025-01-12 PROCEDURE — 81001 URINALYSIS AUTO W/SCOPE: CPT | Performed by: EMERGENCY MEDICINE

## 2025-01-12 PROCEDURE — 85025 COMPLETE CBC W/AUTO DIFF WBC: CPT | Performed by: EMERGENCY MEDICINE

## 2025-01-12 PROCEDURE — 0241U HB NFCT DS VIR RESP RNA 4 TRGT: CPT | Performed by: EMERGENCY MEDICINE

## 2025-01-12 PROCEDURE — 84702 CHORIONIC GONADOTROPIN TEST: CPT | Performed by: EMERGENCY MEDICINE

## 2025-01-12 PROCEDURE — 96376 TX/PRO/DX INJ SAME DRUG ADON: CPT

## 2025-01-12 PROCEDURE — 93010 ELECTROCARDIOGRAM REPORT: CPT | Performed by: INTERNAL MEDICINE

## 2025-01-12 PROCEDURE — 36415 COLL VENOUS BLD VENIPUNCTURE: CPT

## 2025-01-12 PROCEDURE — 99284 EMERGENCY DEPT VISIT MOD MDM: CPT

## 2025-01-12 PROCEDURE — 93005 ELECTROCARDIOGRAM TRACING: CPT

## 2025-01-12 PROCEDURE — 76801 OB US < 14 WKS SINGLE FETUS: CPT

## 2025-01-12 PROCEDURE — 87147 CULTURE TYPE IMMUNOLOGIC: CPT | Performed by: EMERGENCY MEDICINE

## 2025-01-12 PROCEDURE — 83735 ASSAY OF MAGNESIUM: CPT | Performed by: EMERGENCY MEDICINE

## 2025-01-12 PROCEDURE — 96366 THER/PROPH/DIAG IV INF ADDON: CPT

## 2025-01-12 PROCEDURE — 96375 TX/PRO/DX INJ NEW DRUG ADDON: CPT

## 2025-01-12 PROCEDURE — 84484 ASSAY OF TROPONIN QUANT: CPT | Performed by: EMERGENCY MEDICINE

## 2025-01-12 PROCEDURE — 96361 HYDRATE IV INFUSION ADD-ON: CPT

## 2025-01-12 PROCEDURE — 87086 URINE CULTURE/COLONY COUNT: CPT | Performed by: EMERGENCY MEDICINE

## 2025-01-12 PROCEDURE — 99285 EMERGENCY DEPT VISIT HI MDM: CPT | Performed by: EMERGENCY MEDICINE

## 2025-01-12 PROCEDURE — 96365 THER/PROPH/DIAG IV INF INIT: CPT

## 2025-01-12 PROCEDURE — 80053 COMPREHEN METABOLIC PANEL: CPT | Performed by: EMERGENCY MEDICINE

## 2025-01-12 RX ORDER — ONDANSETRON 2 MG/ML
4 INJECTION INTRAMUSCULAR; INTRAVENOUS ONCE
Status: COMPLETED | OUTPATIENT
Start: 2025-01-12 | End: 2025-01-12

## 2025-01-12 RX ORDER — ACETAMINOPHEN 10 MG/ML
1000 INJECTION, SOLUTION INTRAVENOUS ONCE
Status: COMPLETED | OUTPATIENT
Start: 2025-01-12 | End: 2025-01-12

## 2025-01-12 RX ORDER — CEPHALEXIN 500 MG/1
500 CAPSULE ORAL EVERY 12 HOURS SCHEDULED
Qty: 10 CAPSULE | Refills: 0 | Status: SHIPPED | OUTPATIENT
Start: 2025-01-12 | End: 2025-01-17

## 2025-01-12 RX ORDER — FAMOTIDINE 10 MG/ML
20 INJECTION, SOLUTION INTRAVENOUS ONCE
Status: COMPLETED | OUTPATIENT
Start: 2025-01-12 | End: 2025-01-12

## 2025-01-12 RX ORDER — MAGNESIUM SULFATE HEPTAHYDRATE 40 MG/ML
2 INJECTION, SOLUTION INTRAVENOUS ONCE
Status: DISCONTINUED | OUTPATIENT
Start: 2025-01-12 | End: 2025-01-12

## 2025-01-12 RX ORDER — MAGNESIUM SULFATE HEPTAHYDRATE 40 MG/ML
2 INJECTION, SOLUTION INTRAVENOUS ONCE
Status: COMPLETED | OUTPATIENT
Start: 2025-01-12 | End: 2025-01-12

## 2025-01-12 RX ORDER — ONDANSETRON 4 MG/1
4 TABLET, ORALLY DISINTEGRATING ORAL EVERY 8 HOURS PRN
Qty: 20 TABLET | Refills: 0 | Status: SHIPPED | OUTPATIENT
Start: 2025-01-12 | End: 2025-01-24 | Stop reason: SDUPTHER

## 2025-01-12 RX ADMIN — SODIUM CHLORIDE 1000 ML: 0.9 INJECTION, SOLUTION INTRAVENOUS at 18:16

## 2025-01-12 RX ADMIN — MAGNESIUM SULFATE HEPTAHYDRATE 2 G: 40 INJECTION, SOLUTION INTRAVENOUS at 16:24

## 2025-01-12 RX ADMIN — SODIUM CHLORIDE 1000 ML: 0.9 INJECTION, SOLUTION INTRAVENOUS at 16:21

## 2025-01-12 RX ADMIN — ACETAMINOPHEN 1000 MG: 10 INJECTION, SOLUTION INTRAVENOUS at 18:17

## 2025-01-12 RX ADMIN — FAMOTIDINE 20 MG: 10 INJECTION, SOLUTION INTRAVENOUS at 20:05

## 2025-01-12 RX ADMIN — CEPHALEXIN 500 MG: 250 CAPSULE ORAL at 20:22

## 2025-01-12 RX ADMIN — ONDANSETRON 4 MG: 2 INJECTION, SOLUTION INTRAMUSCULAR; INTRAVENOUS at 18:16

## 2025-01-12 RX ADMIN — ONDANSETRON 4 MG: 2 INJECTION, SOLUTION INTRAMUSCULAR; INTRAVENOUS at 16:22

## 2025-01-12 NOTE — ED PROVIDER NOTES
Time reflects when diagnosis was documented in both MDM as applicable and the Disposition within this note       Time User Action Codes Description Comment    1/12/2025  8:15 PM Jarvis Gaston Add [O21.9] Nausea and vomiting during pregnancy     1/12/2025  8:15 PM Daniella Gastonira Add [R82.71] Asymptomatic bacteriuria     1/12/2025  8:16 PM Aguilar Gastonmaira Add [R55] Near syncope     1/12/2025  8:23 PM Daniella Gastonira Add [N83.209] Ovarian cyst           ED Disposition       ED Disposition   Discharge    Condition   Stable    Date/Time   Sun Jan 12, 2025  8:15 PM    Comment   Sandy Alcantar discharge to home/self care.                   Assessment & Plan       Medical Decision Making  Obtain orthostatic vital signs, viral swab, UA, ultrasound pelvis  With IV fluids, antiemetics and continue to monitor patient for any worsening symptoms    Patient's blood work was essentially unremarkable.  Orthostatic vital signs were negative.  Patient started to feel better with multiple doses of antiemetics and IV fluid hydration.  Ultrasound showed a simple left ovarian cyst without any signs of ovarian torsion.  Patient already knew about the ovarian cyst on the left side as she had an outpatient ultrasound few weeks ago.  Case discussed with our OB attending on-call who agreed with IV fluid hydration and antiemetics to control her symptoms today and then discharged home with follow-up to her own OB/GYN.  At this time I discussed with patient that nausea and vomiting is a common symptom of first trimester.  Patient is UA showed concern for bacteria.  I discussed with patient the importance of treating asymptomatic bacteriuria.  Patient has an appointment with her OB on 1/16/25.  At this time, patient is discharged home on as needed Zofran as well as Keflex for asymptomatic bacteriuria with follow-up to PCP, OB, as well as cardiology for any worsening symptoms of lightheadedness and dizziness.  Close return instructions  given to return to the ER for any worsening symptoms.  Patient agrees with discharge plan.  Patient well appearing at time of discharge.    Please Note: Fluency Direct voice recognition software may have been used in the creation of this document. Wrong words or sound a like substitutions may have occurred due to the inherent limitations of the voice software.         Amount and/or Complexity of Data Reviewed  Labs: ordered.  Radiology: ordered.    Risk  Prescription drug management.        ED Course as of 01/12/25 2222   Sun Jan 12, 2025   1600 Orthostatic vital signs are unremarkable.   1730 Patient reexamined at bedside.  Patient is feeling a little better.  Patient requesting to go to the bathroom.   1811 Nurse came back stating that patient started to vomit again.  Will redose IV fluids and antiemetics.   1815 Case discussed with OB attending on-call, Dr. Londono who agrees with treatment plans of IV fluid hydration as well as antiemetic to control nausea.  He reviewed pelvic ultrasound.  He agrees with discharge home with outpatient follow-up to patient's own OB/GYN as scheduled on 1/60/25.       Medications   sodium chloride 0.9 % bolus 1,000 mL (0 mL Intravenous Stopped 1/12/25 1815)   ondansetron (ZOFRAN) injection 4 mg (4 mg Intravenous Given 1/12/25 1622)   magnesium sulfate 2 g/50 mL IVPB (premix) 2 g (0 g Intravenous Stopped 1/12/25 1815)   acetaminophen (Ofirmev) injection 1,000 mg (0 mg Intravenous Stopped 1/12/25 1832)   sodium chloride 0.9 % bolus 1,000 mL (0 mL Intravenous Stopped 1/12/25 2003)   ondansetron (ZOFRAN) injection 4 mg (4 mg Intravenous Given 1/12/25 1816)   Famotidine (PF) (PEPCID) injection 20 mg (20 mg Intravenous Given 1/12/25 2005)   cephalexin (KEFLEX) capsule 500 mg (500 mg Oral Given 1/12/25 2022)       ED Risk Strat Scores                          SBIRT 20yo+      Flowsheet Row Most Recent Value   Initial Alcohol Screen: US AUDIT-C     1. How often do you have a drink containing  alcohol? 0 Filed at: 01/12/2025 1514   2. How many drinks containing alcohol do you have on a typical day you are drinking?  0 Filed at: 01/12/2025 1512   3a. Male UNDER 65: How often do you have five or more drinks on one occasion? 0 Filed at: 01/12/2025 1514   3b. FEMALE Any Age, or MALE 65+: How often do you have 4 or more drinks on one occassion? 0 Filed at: 01/12/2025 1514   Audit-C Score 0 Filed at: 01/12/2025 1518   AZIZA: How many times in the past year have you...    Used an illegal drug or used a prescription medication for non-medical reasons? Never Filed at: 01/12/2025 1514                            History of Present Illness       Chief Complaint   Patient presents with    Syncope     PT STATES THAT SHE PASSED OUT THIS MORNING WHILE CUTTING HAIR. PT IS UNSURE OF TETANUS. PT STATES HTAT SHE GRABBED ON TO THE DOOR HANDLED AND PLACED HERSELF ONTO THE GROUND. PT DENIES HITTING HER HEAD. PT STATES THAT SHE IS 8WEEKS PREG       Past Medical History:   Diagnosis Date    Depression       Past Surgical History:   Procedure Laterality Date    ABDOMINOPLASTY N/A 2/12/2019    Procedure: ABDOMINOPLASTY;  Surgeon: Arian Deutsch MD;  Location: [facility] OR;  Service: Plastics    TONSILLECTOMY      adenoids      History reviewed. No pertinent family history.   Social History     Tobacco Use    Smoking status: Never    Smokeless tobacco: Never   Vaping Use    Vaping status: Every Day   Substance Use Topics    Alcohol use: Yes     Comment: social    Drug use: No      E-Cigarette/Vaping    E-Cigarette Use Current Every Day User       E-Cigarette/Vaping Substances      I have reviewed and agree with the history as documented.     34-year-old G5 A1 P3 approximately 8 weeks pregnant, presents to the ED for evaluation of near syncopal episode at home.  Patient works as a hairdresser.  Patient had a private client that came to her house this morning.  Patient states that during this pregnancy she has had a lot of nausea.   Patient's first appointment with her OB GYN physician, Dr. Espinal is scheduled for this Thursday, 1/16/2025.  Patient states that when she woke up this morning she was able to have some cereal and banana for breakfast.  She then started to cut her clients hair and started to have extreme nausea.  Patient then started to have hot flashes and felt lightheaded.  Patient felt like she was in a pass out and she held her esophagus the wall and lowered herself to the ground.  Patient does not think she had complete LOC.  Subsequently  brought patient to the ED for further evaluation.  Patient had an outpatient elective ultrasound done recently that showed a large cyst in the left side.  Patient has some mild discomfort to the left lower quadrant of her abdomen.  Patient denies any vaginal discharge or bleeding.  Patient denies any fevers or chills.  Patient denies any urinary symptoms.      History provided by:  Patient  Syncope  Associated symptoms: nausea and vomiting    Associated symptoms: no chest pain, no fever, no palpitations, no seizures and no shortness of breath        Review of Systems   Constitutional:  Negative for chills and fever.   HENT:  Negative for ear pain and sore throat.    Eyes:  Negative for pain and visual disturbance.   Respiratory:  Negative for cough and shortness of breath.    Cardiovascular:  Positive for syncope. Negative for chest pain and palpitations.   Gastrointestinal:  Positive for nausea and vomiting. Negative for abdominal pain.   Genitourinary:  Negative for dysuria and hematuria.   Musculoskeletal:  Negative for arthralgias and back pain.   Skin:  Negative for color change and rash.   Neurological:  Positive for light-headedness. Negative for seizures and syncope.   All other systems reviewed and are negative.          Objective       ED Triage Vitals   Temperature Pulse Blood Pressure Respirations SpO2 Patient Position - Orthostatic VS   01/12/25 1222 01/12/25 1222  01/12/25 1222 01/12/25 1222 01/12/25 1222 01/12/25 1500   98 °F (36.7 °C) 88 141/81 18 98 % Sitting      Temp Source Heart Rate Source BP Location FiO2 (%) Pain Score    01/12/25 1222 01/12/25 1222 01/12/25 1519 -- --    Temporal Monitor Left arm        Vitals      Date and Time Temp Pulse SpO2 Resp BP Pain Score FACES Pain Rating User   01/12/25 2030 -- -- -- -- 96/54 -- --    01/12/25 1930 -- 74 99 % 17 102/55 -- --    01/12/25 1900 -- 61 100 % 10 102/62 -- --    01/12/25 1730 -- 86 100 % 19 96/53 -- --    01/12/25 1630 -- 68 100 % 18 96/55 -- --    01/12/25 1522 -- 74 -- 20 112/62 -- --    01/12/25 1520 -- 63 -- 16 108/60 -- --    01/12/25 1519 -- 54 -- 16 99/55 -- --    01/12/25 1500 -- 74 100 % -- 113/53 -- --    01/12/25 1222 98 °F (36.7 °C) 88 98 % 18 141/81 -- -- LD            Physical Exam  Vitals and nursing note reviewed.   Constitutional:       General: She is not in acute distress.     Appearance: She is well-developed.   HENT:      Head: Normocephalic and atraumatic.   Eyes:      Conjunctiva/sclera: Conjunctivae normal.   Cardiovascular:      Rate and Rhythm: Normal rate and regular rhythm.      Heart sounds: No murmur heard.  Pulmonary:      Effort: Pulmonary effort is normal. No respiratory distress.      Breath sounds: Normal breath sounds.   Abdominal:      Palpations: Abdomen is soft.      Tenderness: There is no abdominal tenderness.   Musculoskeletal:         General: No swelling.      Cervical back: Neck supple.   Skin:     General: Skin is warm and dry.      Capillary Refill: Capillary refill takes less than 2 seconds.   Neurological:      General: No focal deficit present.      Mental Status: She is alert and oriented to person, place, and time.      Comments: No focal neurodeficits noted.   Psychiatric:         Mood and Affect: Mood normal.         Results Reviewed       Procedure Component Value Units Date/Time    HS Troponin I 2hr [[id number]] Collected: 01/12/25 1533     Lab Status: Final result Specimen: Blood from Arm, Left Updated: 01/12/25 1605     hs TnI 2hr <2 ng/L      Delta 2hr hsTnI --    Urine Microscopic [[id number]]  (Abnormal) Collected: 01/12/25 1537    Lab Status: Final result Specimen: Urine, Clean Catch Updated: 01/12/25 1604     RBC, UA 2-4 /hpf      WBC, UA 4-10 /hpf      Epithelial Cells Moderate /hpf      Bacteria, UA Occasional /hpf      MUCUS THREADS Occasional     Granular Casts, UA 0-3     Amorphous Crystals, UA Moderate    FLU/RSV/COVID - if FLU/RSV clinically relevant (2hr TAT) [[id number]]  (Normal) Collected: 01/12/25 1516    Lab Status: Final result Specimen: Nares from Nose Updated: 01/12/25 1603     SARS-CoV-2 Negative     INFLUENZA A PCR Negative     INFLUENZA B PCR Negative     RSV PCR Negative    Narrative:      This test has been performed using the CoV-2/Flu/RSV plus assay on the [employer] platform. This test has been validated by the  and verified by the performing laboratory.     This test is designed to amplify and detect the following: nucleocapsid (N), envelope (E), and RNA-dependent RNA polymerase (RdRP) genes of the SARS-CoV-2 genome; matrix (M), basic polymerase (PB2), and acidic protein (PA) segments of the influenza A genome; matrix (M) and non-structural protein (NS) segments of the influenza B genome, and the nucleocapsid genes of RSV A and RSV B.     Positive results are indicative of the presence of Flu A, Flu B, RSV, and/or SARS-CoV-2 RNA. Positive results for SARS-CoV-2 or suspected novel influenza should be reported to state, local, or federal health departments according to local reporting requirements.      All results should be assessed in conjunction with clinical presentation and other laboratory markers for clinical management.     FOR PEDIATRIC PATIENTS - copy/paste COVID Guidelines URL to browser: [email or web address]       UA (URINE) with reflex to  Scope [[id number]]  (Abnormal) Collected: 01/12/25 1537    Lab Status: Final result Specimen: Urine, Clean Catch Updated: 01/12/25 1603     Color, UA Yellow     Clarity, UA Slightly Cloudy     Specific Gravity, UA 1.020     pH, UA 7.5     Leukocytes, UA Large     Nitrite, UA Negative     Protein, UA Negative mg/dl      Glucose, UA Negative mg/dl      Ketones, UA Negative mg/dl      Urobilinogen, UA <2.0 mg/dl      Bilirubin, UA Negative     Occult Blood, UA Negative    Magnesium [[id number]]  (Normal) Collected: 01/12/25 1516    Lab Status: Final result Specimen: Blood from Line Updated: 01/12/25 1542     Magnesium 1.9 mg/dL     Urine culture [[id number]] Collected: 01/12/25 1537    Lab Status: In process Specimen: Urine, Clean Catch Updated: 01/12/25 1541    HS Troponin I 4hr [[id number]]     Lab Status: No result Specimen: Blood     hCG, quantitative [[id number]]  (Abnormal) Collected: 01/12/25 1227    Lab Status: Final result Specimen: Blood from Arm, Right Updated: 01/12/25 1318     HCG, Quant 98,875.1 mIU/mL     Narrative:       Expected Ranges:    HCG results between 5.0 and 25.0 mIU/mL may be indicative of early pregnancy but should be interpreted in light of the total clinical presentation.    HCG can rise to detectable levels in mikel and post menopausal women (0-11.6 mIU/mL).     Approximate               Approximate HCG  Gestation age          Concentration ( mIU/mL)  _____________          ______________________   Weeks                      HCG values  0.2-1                       5-50  1-2                           2-3                         100-5000  3-4                         500-90227  4-5                         1000-36258  5-6                         [id number]  6-8                         [id number]  8-12                        01923-110143      HS Troponin 0hr (reflex protocol) [[id number]]  (Normal) Collected: 01/12/25 1227    Lab Status: Final result Specimen: Blood from Arm, Right Updated:  01/12/25 1255     hs TnI 0hr <2 ng/L     Comprehensive metabolic panel [[id number]]  (Abnormal) Collected: 01/12/25 1227    Lab Status: Final result Specimen: Blood from Arm, Right Updated: 01/12/25 1248     Sodium 137 mmol/L      Potassium 4.3 mmol/L      Chloride 105 mmol/L      CO2 27 mmol/L      ANION GAP 5 mmol/L      BUN 9 mg/dL      Creatinine 0.46 mg/dL      Glucose 80 mg/dL      Calcium 8.8 mg/dL      AST 10 U/L      ALT 10 U/L      Alkaline Phosphatase 23 U/L      Total Protein 6.7 g/dL      Albumin 4.1 g/dL      Total Bilirubin 0.37 mg/dL      eGFR 132 ml/min/1.73sq m     Narrative:      National Kidney Disease Foundation guidelines for Chronic Kidney Disease (CKD):     Stage 1 with normal or high GFR (GFR > 90 mL/min/1.73 square meters)    Stage 2 Mild CKD (GFR = 60-89 mL/min/1.73 square meters)    Stage 3A Moderate CKD (GFR = 45-59 mL/min/1.73 square meters)    Stage 3B Moderate CKD (GFR = 30-44 mL/min/1.73 square meters)    Stage 4 Severe CKD (GFR = 15-29 mL/min/1.73 square meters)    Stage 5 End Stage CKD (GFR <15 mL/min/1.73 square meters)  Note: GFR calculation is accurate only with a steady state creatinine    CBC and differential [[id number]]  (Abnormal) Collected: 01/12/25 1227    Lab Status: Final result Specimen: Blood from Arm, Right Updated: 01/12/25 1234     WBC 9.75 Thousand/uL      RBC 4.01 Million/uL      Hemoglobin 12.1 g/dL      Hematocrit 35.7 %      MCV 89 fL      MCH 30.2 pg      MCHC 33.9 g/dL      RDW 12.2 %      MPV 11.3 fL      Platelets 194 Thousands/uL      nRBC 0 /100 WBCs      Segmented % 79 %      Immature Grans % 1 %      Lymphocytes % 14 %      Monocytes % 5 %      Eosinophils Relative 1 %      Basophils Relative 0 %      Absolute Neutrophils 7.72 Thousands/µL      Absolute Immature Grans 0.09 Thousand/uL      Absolute Lymphocytes 1.32 Thousands/µL      Absolute Monocytes 0.53 Thousand/µL      Eosinophils Absolute 0.06 Thousand/µL      Basophils Absolute 0.03 Thousands/µL              US OB < 14 weeks with transvaginal   Final Interpretation by Clifton Angeles MD (01/12 1641)      Single live intrauterine gestation at 8 weeks, 4 days (range +/- 3 days) by crown-rump length. JELANI of 08/20/2025.      4.3 cm left ovarian simple cyst without findings of torsion, noting ultrasound cannot exclude partial or intermittent torsion in the setting of an enlarged tender ovary.      Workstation performed: [id number]             ECG 12 Lead Documentation Only    Date/Time: 1/12/2025 12:25 PM    Performed by: Jarvis Gaston DO  Authorized by: Jarvis Gaston DO    Indications / Diagnosis:  Near syncope  ECG reviewed by me, the ED Provider: yes    Patient location:  ED  Previous ECG:     Previous ECG:  Unavailable    Comparison to cardiac monitor: Yes    Interpretation:     Interpretation: normal    Comments:      Sinus rhythm, rate 69, normal axis, normal intervals, no acute ST/T wave abnormalities noted, otherwise unremarkable EKG, no previous EKG available for comparison.      ED Medication and Procedure Management   Prior to Admission Medications   Prescriptions Last Dose Informant Patient Reported? Taking?   buPROPion (WELLBUTRIN SR) 150 mg 12 hr tablet   Yes No   Sig: Take 150 mg by mouth 2 (two) times a day   oxyCODONE-acetaminophen (PERCOCET) 5-325 mg per tablet   No No   Sig: Take 1 tablet by mouth every 4 (four) hours as needed for moderate pain for up to 30 dosesMax Daily Amount: 6 tablets      Facility-Administered Medications: None     Discharge Medication List as of 1/12/2025  8:18 PM        START taking these medications    Details   cephalexin (KEFLEX) 500 mg capsule Take 1 capsule (500 mg total) by mouth every 12 (twelve) hours for 5 days, Starting Sun 1/12/2025, Until Fri 1/17/2025, Normal      ondansetron (ZOFRAN-ODT) 4 mg disintegrating tablet Take 1 tablet (4 mg total) by mouth every 8 (eight) hours as needed for nausea or vomiting, Starting Sun 1/12/2025, Normal            CONTINUE these medications which have NOT CHANGED    Details   buPROPion (WELLBUTRIN SR) 150 mg 12 hr tablet Take 150 mg by mouth 2 (two) times a day, Historical Med      oxyCODONE-acetaminophen (PERCOCET) 5-325 mg per tablet Take 1 tablet by mouth every 4 (four) hours as needed for moderate pain for up to 30 dosesMax Daily Amount: 6 tablets, Starting Wed 2/13/2019, Print           No discharge procedures on file.  ED SEPSIS DOCUMENTATION   Time reflects when diagnosis was documented in both MDM as applicable and the Disposition within this note       Time User Action Codes Description Comment    1/12/2025  8:15 PM Jarvis Gaston Add [O21.9] Nausea and vomiting during pregnancy     1/12/2025  8:15 PM Jarvis Gaston Add [R82.71] Asymptomatic bacteriuria     1/12/2025  8:16 PM Jarvis Gaston Add [R55] Near syncope     1/12/2025  8:23 PM Jarvis Gaston Add [N83.209] Ovarian cyst                  Jarvis Walkerus, DO  01/12/25 2020       Jarvis Walkerus, DO  01/12/25 2024       Jarvis Cokerdous, DO  01/12/25 2025       Daniellaira Deniseus, DO  01/12/25 2226

## 2025-01-12 NOTE — TELEPHONE ENCOUNTER
"Regardin weeks pregnant/fainted/fell during faint/nausea/shin toe injury/overheated/SOB  ----- Message from Pricilla OTERO sent at 2025 10:28 AM EST -----  Pt stated, \"I am 8 weeks pregnant. I was at work and got over heated and passed out. I felt dizzy, nauseas and over heated when it happened. I fell when I passed out. I scrapped up my shins and toes. I braced myself and don't believe I injured my head or belly. I have been struggling with nausea and vomiting from my supplements I believe.  I am currently home and with someone. I am having slight SOB. I am new to [facility] and have my first appt scheduled with OB but have not been to establish care yet.\"    "

## 2025-01-12 NOTE — TELEPHONE ENCOUNTER
"Reason for Disposition  • [1] No prior tetanus shots (or is not fully vaccinated) AND [2] any wound (e.g., cut, scrape)  • Sounds like a serious injury to the triager    Additional Information  • Patient  has a wound (abrasion, cut, puncture)    Answer Assessment - Initial Assessment Questions  1. MECHANISM: \"How did the fall happen?\"      Patient felt dizzy and nauseous, and felt hot and sweaty. Patient was feeling slight SOB and hot again. Patient vomited. Was trying to go outside and lowered self to ground while holding onto door handle. States she scraped her shins and toes. Denies LOC. Denies head strike. Patient currently feeling better. Denies dizziness. Still feeling nauseous.     2. DOMESTIC VIOLENCE SCREENING: \"Did you fall because someone pushed you or tried to hurt you?\"      N/A    3. ONSET: \"When did the fall happen?\" (e.g., minutes, hours, or days ago)      Around 9:30 this morning    4. LOCATION: \"What part of the body hit the ground?\" (e.g., back, buttocks, head, hips, knees, hands, head, stomach)      Legs and feet.     5. INJURY: \"Did you get hurt when you fell? Are there any obvious injuries?\" If Yes, ask: \"What does the injury look like?\"      Scrapes to toes and shins.    6. PAIN: \"Is there any pain?\" If Yes, ask: \"How bad is the pain?\" (e.g., Scale 1-10; or mild,       4/10 pain to toes    7. SIZE: For cuts, bruises, or swelling, ask: \"How large is it?\" (e.g., inches or centimeters)      Size of a dime    8. JELANI: \"What date are you expecting to deliver?\"      8/18/25    9. FETAL MOVEMENT: \"Has the baby's movement decreased or changed significantly from       N/A    10. TETANUS: For any breaks in the skin, ask: \"When was the last tetanus booster?\"        Unknown    11. OTHER SYMPTOMS: \"Do you have any other symptoms?\" (e.g., abdomen pain, contractions, leaking of fluid from vagina or concerned water broke, vaginal bleeding)        Slight SOB, nausea    Protocols used: Pregnancy - " Fall-Adult-AH

## 2025-01-13 LAB — BACTERIA UR CULT: ABNORMAL

## 2025-01-16 ENCOUNTER — ULTRASOUND (OUTPATIENT)
Age: 35
End: 2025-01-16
Payer: COMMERCIAL

## 2025-01-16 VITALS — DIASTOLIC BLOOD PRESSURE: 70 MMHG | SYSTOLIC BLOOD PRESSURE: 100 MMHG

## 2025-01-16 DIAGNOSIS — N91.2 AMENORRHEA: ICD-10-CM

## 2025-01-16 DIAGNOSIS — Z13.79 GENETIC SCREENING: ICD-10-CM

## 2025-01-16 DIAGNOSIS — Z32.01 POSITIVE PREGNANCY TEST: Primary | ICD-10-CM

## 2025-01-16 PROCEDURE — 76817 TRANSVAGINAL US OBSTETRIC: CPT | Performed by: OBSTETRICS & GYNECOLOGY

## 2025-01-16 PROCEDURE — 99203 OFFICE O/P NEW LOW 30 MIN: CPT | Performed by: OBSTETRICS & GYNECOLOGY

## 2025-01-16 NOTE — PROGRESS NOTES
Ultrasound Probe Disinfection    A transvaginal ultrasound was performed.   Prior to use, disinfection was performed with High Level Disinfection Process (Trophon)  Probe serial number  [id number]  was used    Jumana Pickering MA  01/16/25  2:37 PM

## 2025-01-16 NOTE — PROGRESS NOTES
Subjective      Sandy Alcantar is a 34 y.o. female. Sandy reports delayed menses since 11/13/24. She is not in acute distress. Ectopic risks: none. Had ER visit from the other day for N/V, was given zofran. Had IUD removed in March.  Has h/o LOV cyst.  Was given Keflex for asx bacteruria.      Cycle length: regular q 22-30 days.  Pregnancy testing: at home.  Pregnancy imaging:  ER 1/12 .  Blood type: A positive.  Other lab results: urine culture pos for staph and beta on 1/12 of 98,875 .    The following portions of the patient's history were reviewed and updated as appropriate: allergies, current medications, past family history, past medical history, past social history, past surgical history, and problem list.    Review of Systems  Pertinent items are noted in HPI.     Objective      /70   LMP 11/13/2024   Breastfeeding No     General: alert and oriented, in no acute distress   Heart: Deferred   Lungs: Deferred, not in respiratory distress   Abdomen: Soft, non tender   Vulva: NEFG   Vagina: Deferred   Cervix: Non tender    Uterus: Appropriate for size   Adnexa: Difficult to fully assess     Imaging  Limited office ultrasound: u/s done        Procedures      SIUP @ 9.1wks by LMP c/w u/s today with JELANI of 8/20/25.    +FCA @ 169      Assessment/Plan  For new OB visit  For MFM NIPT u/s  RTO in 4wks.         KYLE Espinal MD, FACOG

## 2025-01-21 ENCOUNTER — OB ABSTRACT (OUTPATIENT)
Dept: OBGYN CLINIC | Facility: MEDICAL CENTER | Age: 35
End: 2025-01-21

## 2025-01-21 NOTE — PATIENT INSTRUCTIONS
Congratulations on your pregnancy!  We thank you for allowing us to participate in your care.    NEXT STEPS    Go to the lab to have your prenatal bloodwork competed if you have not already done so.  There is a listing of [facility] Laboratories and locations in your prenatal folder. You may also visit [email or web address] or call [phone].   Please be aware that some insurance companies may require you to go to a specific lab (ex. [facility] or [facility]). You can verify this by contacting your insurance company.   If you have decided to be screened for CF and SMA genetic testing, these tests require prior authorization and scheduling.  Prior Authorization is not a guarantee of payment. There may be out of pocket expenses that includes copays, deductibles and or coinsurance for your individual plan.  Please call [phone] if our team has not contacted you in 7 business days.  Please have your blood work completed prior to you next prenatal visit.    If you have decided to have genetic testing done at [facility], that will be scheduled by [facility]. You may have already scheduled this appointment.  If not, please call their office to schedule this appointment.  Based on the referral placed by our office, they will know how to schedule you appropriately.    Contact information for [facility] is located in your prenatal folder. The main phone number to their office is [phone].     Return to our office for your first routine prenatal visit.     Warning Signs During Pregnancy - If you experience any problems or concerns, call the office directly.  The list below includes warning signs your providers would like you to be aware of.  If you experience any of these at any time during your pregnancy, please call us as soon as possible.    Vaginal bleeding   Sharp abdominal pain that does not go away   Fever (more than 100.4?F and is not relieved with Tylenol)   Persistent vomiting lasting greater than 24  hours   Chest pain/Shortness of breath   Pain or burning when you urinate     Call the OFFICE [phone] for any questions/emergencies.  At night or on the weekend, calls go through a triage service, please indicate it is an emergency and the DOCTOR on call will be paged.    Remember to only use MyChart for non-urgent concerns or questions.    Our doctors deliver at [facility] in [place]. The address is provided below.     [facility]  [address] [place]  [address]     Please click on the links below to review our Pregnancy Essential Guide.    [facility] Pregnancy Essentials Guide  [facility] Women's Health ([email or web address])     [facility] - Virtual Tour ([email or web address])      To learn more about the Prenatal Education classes that [facility] offers, click the link below.  Prenatal Education Classes    Click on the link below to review [facility] Lab locations.  [facility] Lab Locations    [employer] resource  [employer] is a tool to connect you to community resources you may need.      Thank you,   Kaykay PATTERSON, RN  OB Nurse Navigator

## 2025-01-21 NOTE — PROGRESS NOTES
OB INTAKE INTERVIEW 2025    Patient is 34 y.o. who presents for OB intake [id number].  She is accompanied by her significant other during this encounter.  The father of her baby Alexis Calvin is involved in the pregnancy.      Patient's last menstrual period was 2024.  Ultrasound: Measured 9 weeks 1 days on 2025   Estimated Date of Delivery: 25 confirmed by dating ultrasound. 9 week US w/RMR    Signs/Symptoms of Pregnancy  Current pregnancy symptoms: fatigue, nausea, vomiting, frequent urination, and constipation  Headaches: YES - occasional  Cramping: YES - occasional  Spotting: no  PICA cravings: no    Diabetes:  Body mass index is 28.24 kg/m².  If patient has 1 or more, please order early 1 hour GTT  History of GDM: no  BMI >35 no  History of PCOS or current metformin use: no  History of LGA/macrosomic infant (4000g/9lbs): no    If patient has 2 or more, please order early 1 hour GTT  BMI>30 no  AMA: no  First degree relative with type 2 diabetes: no  History of chronic HTN, hyperlipidemia, elevated A1C: no  High risk race (, , ,  or ): no    Hypertension: if you answer yes to any of the following, please order baseline preeclampsia labs (cbc, comprehensive metabolic panel, urine protein creatinine ratio, uric acid)  History of of chronic HTN: no  History of gestational HTN: no  History of preeclampsia, eclampsia, or HELLP syndrome: no  History of diabetes: no  History of lupus,sjogrens syndrome, kidney disease no    Thyroid: if yes order TSH with reflex T4  History of thyroid disease: no    Bleeding Disorder or Hx of DVT - patient or first degree relative with history of. Order the following if not done previously.   (Factor V, antithrombin III, prothrombin gene mutation, protein C and S Ag, lupus anticoagulant, anticardiolipin, beta-2 glycoprotein):   no    OB/GYN:  History of abnormal pap smear: YES - never needed colpo  Date  of last pap smear:   History of HPV: no  History of Herpes/HSV: no  History of other STI: (gonorrhea, chlamydia, trich) no  History of prior : YES x3  History of prior : no  History of  delivery prior to 36 weeks 6 days: no  History of blood transfusion: no  Ok for blood transfusion: YES    Substance screening-   History of tobacco use no  Currently using tobacco no  Substance Use Screen Level:  No Risk    MRSA Screening:   Does the pt have a hx of MRSA? no    Mental Health:  Hx of/or current dx of depression: YES  Hx of/or current dx of anxiety: YES  Medications: YES medicated w/Wellbutrin in the past, weaned off in March  EPDS Screen:  Positive  and referral place for Baby and Me / score: 18    Dental Health:  Patient has seen a dentist in the past 6 months: no    Immunizations:  Influenza vaccine given this season: YES   Discussed Tdap vaccine:  YES  Discussed COVID Vaccine:  YES - in the the past  History of Varicella or Vaccination had chicken pox    Genetic/MFM:  Do you or your partner have a history of any of the following in yourselves or first degree relatives?  Cystic fibrosis: no  Spinal muscular atrophy: no  Hemoglobinopathy/Sickle Cell/Thalassemia: no  Fragile X Intellectual Disability: no    If yes, discuss Carrier Screening and recommend consultation with [facility]/Genetic Counseling and place specific [facility] Referral placed    Discussed Carrier Screening being completed once in a lifetime as a standard of care lab test. Place orders for Cystic Fibrosis Gene Test ([id number]) and Spinal Muscular Atrophy DNA ([id number]).  Patient was informed that prior authorization needs to be completed for these tests and this may take 7-10 business days.  Patient does desire testing for Cystic Fibrosis and Spinal Muscular Atrophy.    ACOG Patient Education Cystic Fibrosis and Spinal Muscular Atrophy prenatal screening given.    Appointment for Nuchal Translucency Ultrasound at [facility] is scheduled for  2/12/25.    Interview education:  [facility] Pregnancy Essentials Book reviewed, discussed and attached to their AVS: YES     Nurse/Family Partnership-patient may qualify NO; referral placed NO     Prenatal lab work scripts: YES    Extra labs ordered: Cystic Fibrosis gene test and Spinal muscular atrophy DNA    Aspirin/Preeclampsia Screen    Risk Level Risk Factor Recommendation   LOW Prior Uncomplicated full-term delivery no No Aspirin recommendation        MODERATE Nulliparity no Recommend low-dose aspirin if     BMI>30 no 2 or more moderate risk factors    Family History Preeclampsia (mother/sister) no     35yr old or greater no     Black Race, Concern for SDOH/Low Socioeconomic no     IVF Pregnancy  no     Personal History Risks (low birth weight, prior adverse preg outcome, >10yr preg interval) no         HIGH History of Preeclampsia no Recommend low-dose aspirin if     Multifetal gestation no 1 or more high risk factors    Chronic HTN no     Type 1 or 2 Diabetes no     Renal Disease no     Autoimmune Disease  no      Contraindications to ASA therapy:  NSAID/ ASA allergy: no  Nasal polyps: no  Asthma with history of ASA induced bronchospasm: no    Relative contraindications:  History of GI bleed: no  Active peptic ulcer disease: no  Severe hepatic dysfunction: no    Patient does not meet recommendation to take ASA 162mg during this pregnancy from 12-36 wks to lower her risk of preeclampsia.      The patient has a history now or in prior pregnancy notable for:  Depression/anxiety, migraines, polyhydramnios in prior pregnancy       Details that I feel the provider should be aware of: Sandy was seen here in office for her OB Intake visit, Hx obtained, c/o nausea/vomiting, discussed B6/Unisom dosing, also reviewed avoiding an empty belly.  Reviewed medications safe to take in pregnancy.  [facility] Essentials packet/link reviewed. [facility] Baby and Me classes reviewed and how to register for classes. Reviewed timing of  prenatal lab draw, verbalized understanding. Discussed elevated EPDS, pt agreeable to Baby and Me referral, referral placed.  Reviewed that if she ever feels that she will harm herself or others, she needs to be evaluated at closest ER, verbalized understanding.  Pt states that she had stopped taking her Wellbutrin in March 2024    PN1 visit scheduled. The patient was oriented to our practice, the navigator role, reviewed delivering physicians and [facility] for delivery. All questions were answered.    Interviewed by: Kaykay Singer RN

## 2025-01-24 ENCOUNTER — INITIAL PRENATAL (OUTPATIENT)
Age: 35
End: 2025-01-24
Payer: COMMERCIAL

## 2025-01-24 VITALS
HEIGHT: 60 IN | DIASTOLIC BLOOD PRESSURE: 58 MMHG | WEIGHT: 144.6 LBS | SYSTOLIC BLOOD PRESSURE: 118 MMHG | BODY MASS INDEX: 28.39 KG/M2

## 2025-01-24 DIAGNOSIS — Z13.31 POSITIVE DEPRESSION SCREENING: ICD-10-CM

## 2025-01-24 DIAGNOSIS — Z31.430 ENCOUNTER OF FEMALE FOR TESTING FOR GENETIC DISEASE CARRIER STATUS FOR PROCREATIVE MANAGEMENT: ICD-10-CM

## 2025-01-24 DIAGNOSIS — O21.9 NAUSEA AND VOMITING DURING PREGNANCY: ICD-10-CM

## 2025-01-24 DIAGNOSIS — Z34.81 MULTIGRAVIDA IN FIRST TRIMESTER: Primary | ICD-10-CM

## 2025-01-24 PROCEDURE — T1001 NURSING ASSESSMENT/EVALUATN: HCPCS

## 2025-01-24 RX ORDER — ONDANSETRON 4 MG/1
4 TABLET, ORALLY DISINTEGRATING ORAL EVERY 8 HOURS PRN
Qty: 20 TABLET | Refills: 0 | Status: SHIPPED | OUTPATIENT
Start: 2025-01-24

## 2025-01-28 ENCOUNTER — NURSE TRIAGE (OUTPATIENT)
Age: 35
End: 2025-01-28

## 2025-01-28 DIAGNOSIS — R39.15 URINARY URGENCY: Primary | ICD-10-CM

## 2025-01-28 NOTE — TELEPHONE ENCOUNTER
Patient notified of provider recommendation to do UA C&S. Patient uses [facility].Patient states she is having some discomfort.with urgency and frequency of urination. Patient wants to be sure UTI cleared.  Patient states shew will go to [facility] today. UA C&S orders placed per provider note.  Patient advised any increase pain, fever, bleeding or any other concerning symptom to call back, patient verbalzied understanding and thankful for follow up.

## 2025-01-28 NOTE — TELEPHONE ENCOUNTER
"Pt calling in Pt is pregnant 10w6d , stating that she was to have urine testing done on 25 and pt saying that when she went to the lab the orders weren't included for UA and urine culture. Pt was prescribed keflex for a UTI on 25 and pt didn't complete the course as she had 2 pills left and was vomiting and didn't complete the medication. Pt last took keflex on Tuesday last week 25 . Pt is c/o urgency, frequency, vaginal dryness and discomfort. Pt denies blood in urine, fever, flank pain, pain with urination     Pt was notified a message will be sent to the provider to clarify if she's able to go for urine testing again or is unable to due to recent antibiotic use. Pt was notified a message will be sent to the provider and she will be called back, Pt verbalized understanding, no further questions at this time          Answer Assessment - Initial Assessment Questions  1. SYMPTOM: \"What's the main symptom you're concerned about?\" (e.g., frequency, incontinence)      Pt is experiencing urinary frequency, urgency   2. ONSET: \"When did the  symptoms   start?\"      25   3. PAIN: \"Is there any pain?\" If Yes, ask: \"How bad is it?\" (Scale: 1-10; mild, moderate, severe)      Discomfort   4. CAUSE: \"What do you think is causing the symptoms?\"      UTI   5. OTHER SYMPTOMS: \"Do you have any other symptoms?\" (e.g., blood in urine, fever, flank pain, pain with urination)      Pt denies blood in urine, fever, flank pain, pain with urination   6. PREGNANCY: \"Is there any chance you are pregnant?\" \"When was your last menstrual period?\"      10w6d    Protocols used: Urinary Symptoms-Adult-[place]    "

## 2025-01-30 ENCOUNTER — RESULTS FOLLOW-UP (OUTPATIENT)
Age: 35
End: 2025-01-30

## 2025-01-30 LAB
APPEARANCE UR: CLEAR
BACTERIA UR CULT: NORMAL
BACTERIA URNS QL MICRO: NORMAL
BILIRUB UR QL STRIP: NEGATIVE
CASTS URNS QL MICRO: NORMAL /LPF
COLOR UR: YELLOW
EPI CELLS #/AREA URNS HPF: NORMAL /HPF (ref 0–10)
GLUCOSE UR QL: NEGATIVE
HGB UR QL STRIP: NEGATIVE
KETONES UR QL STRIP: NEGATIVE
LEUKOCYTE ESTERASE UR QL STRIP: NEGATIVE
Lab: NO GROWTH
MICRO URNS: NORMAL
MICRO URNS: NORMAL
NITRITE UR QL STRIP: NEGATIVE
PH UR STRIP: 6 [PH] (ref 5–7.5)
PROT UR QL STRIP: NEGATIVE
RBC #/AREA URNS HPF: NORMAL /HPF (ref 0–2)
SP GR UR: 1.02 (ref 1–1.03)
UROBILINOGEN UR STRIP-ACNC: 0.2 MG/DL (ref 0.2–1)
WBC #/AREA URNS HPF: NORMAL /HPF (ref 0–5)

## 2025-02-10 LAB
ABO GROUP BLD: ABNORMAL
APPEARANCE UR: CLEAR
BACTERIA UR CULT: ABNORMAL
BACTERIA UR CULT: NO GROWTH
BACTERIA URNS QL MICRO: NORMAL
BASOPHILS # BLD AUTO: 0 X10E3/UL (ref 0–0.2)
BASOPHILS NFR BLD AUTO: 0 %
BILIRUB UR QL STRIP: NEGATIVE
BLD GP AB SCN SERPL QL: NEGATIVE
C TRACH RRNA SPEC QL NAA+PROBE: NEGATIVE
CASTS URNS QL MICRO: NORMAL /LPF
CITATION REF LAB TEST: NORMAL
COLOR UR: YELLOW
EOSINOPHIL # BLD AUTO: 0.1 X10E3/UL (ref 0–0.4)
EOSINOPHIL NFR BLD AUTO: 1 %
EPI CELLS #/AREA URNS HPF: NORMAL /HPF (ref 0–10)
ERYTHROCYTE [DISTWIDTH] IN BLOOD BY AUTOMATED COUNT: 13.1 % (ref 11.7–15.4)
ETHNIC BACKGROUND STATED: NORMAL
GENE DIS ANL CARRIER INTERP-IMP: NORMAL
GENE STUDIED ID: NORMAL
GLUCOSE UR QL: NEGATIVE
HBV SURFACE AG SERPL QL IA: NEGATIVE
HCT VFR BLD AUTO: 33.7 % (ref 34–46.6)
HCV AB S/CO SERPL IA: NON REACTIVE
HGB BLD-MCNC: 11.4 G/DL (ref 11.1–15.9)
HGB UR QL STRIP: NEGATIVE
HIV 1+2 AB+HIV1 P24 AG SERPL QL IA: NON REACTIVE
IMM GRANULOCYTES # BLD: 0 X10E3/UL (ref 0–0.1)
IMM GRANULOCYTES NFR BLD: 0 %
KETONES UR QL STRIP: NEGATIVE
LAB DIRECTOR NAME PROVIDER: NORMAL
LABORATORY COMMENT REPORT: NORMAL
LEUKOCYTE ESTERASE UR QL STRIP: NEGATIVE
LYMPHOCYTES # BLD AUTO: 1.8 X10E3/UL (ref 0.7–3.1)
LYMPHOCYTES NFR BLD AUTO: 19 %
Lab: NORMAL
MCH RBC QN AUTO: 31 PG (ref 26.6–33)
MCHC RBC AUTO-ENTMCNC: 33.8 G/DL (ref 31.5–35.7)
MCV RBC AUTO: 92 FL (ref 79–97)
MICRO URNS: ABNORMAL
MICRO URNS: ABNORMAL
MONOCYTES # BLD AUTO: 0.5 X10E3/UL (ref 0.1–0.9)
MONOCYTES NFR BLD AUTO: 5 %
N GONORRHOEA RRNA SPEC QL NAA+PROBE: NEGATIVE
NEUTROPHILS # BLD AUTO: 7.2 X10E3/UL (ref 1.4–7)
NEUTROPHILS NFR BLD AUTO: 75 %
NITRITE UR QL STRIP: NEGATIVE
PH UR STRIP: 6 [PH] (ref 5–7.5)
PLATELET # BLD AUTO: 217 X10E3/UL (ref 150–450)
PROT UR QL STRIP: NEGATIVE
RBC # BLD AUTO: 3.68 X10E6/UL (ref 3.77–5.28)
RBC #/AREA URNS HPF: NORMAL /HPF (ref 0–2)
REASON FOR REFERRAL (NARRATIVE): NORMAL
RECOMMENDATION PATIENT DOC-IMP: NORMAL
REF LAB TEST METHOD: NORMAL
RH BLD: POSITIVE
RPR SER QL: NON REACTIVE
RUBV IGG SERPL IA-ACNC: 1.72 INDEX
SL AMB INTERPRETATION: NORMAL
SMN1 GENE MUT ANL BLD/T: NORMAL
SP GR UR: 1.02 (ref 1–1.03)
SPECIMEN PREPARATION: NORMAL
UROBILINOGEN UR STRIP-ACNC: 0.2 MG/DL (ref 0.2–1)
WBC # BLD AUTO: 9.6 X10E3/UL (ref 3.4–10.8)
WBC #/AREA URNS HPF: NORMAL /HPF (ref 0–5)

## 2025-02-12 ENCOUNTER — ROUTINE PRENATAL (OUTPATIENT)
Dept: PERINATAL CARE | Facility: OTHER | Age: 35
End: 2025-02-12
Payer: COMMERCIAL

## 2025-02-12 VITALS
HEART RATE: 94 BPM | DIASTOLIC BLOOD PRESSURE: 66 MMHG | BODY MASS INDEX: 28.39 KG/M2 | WEIGHT: 144.6 LBS | HEIGHT: 60 IN | SYSTOLIC BLOOD PRESSURE: 106 MMHG

## 2025-02-12 DIAGNOSIS — Z36.0 ENCOUNTER FOR ANTENATAL SCREENING FOR CHROMOSOMAL ANOMALIES: ICD-10-CM

## 2025-02-12 DIAGNOSIS — Z3A.13 13 WEEKS GESTATION OF PREGNANCY: Primary | ICD-10-CM

## 2025-02-12 DIAGNOSIS — Z33.1 PREGNANT STATE, INCIDENTAL: ICD-10-CM

## 2025-02-12 DIAGNOSIS — Z32.01 POSITIVE PREGNANCY TEST: ICD-10-CM

## 2025-02-12 DIAGNOSIS — O36.80X0 ENCOUNTER TO DETERMINE FETAL VIABILITY OF PREGNANCY, SINGLE OR UNSPECIFIED FETUS: ICD-10-CM

## 2025-02-12 DIAGNOSIS — Z36.82 ENCOUNTER FOR NUCHAL TRANSLUCENCY TESTING: ICD-10-CM

## 2025-02-12 DIAGNOSIS — Z13.79 GENETIC SCREENING: ICD-10-CM

## 2025-02-12 PROCEDURE — 99243 OFF/OP CNSLTJ NEW/EST LOW 30: CPT | Performed by: OBSTETRICS & GYNECOLOGY

## 2025-02-12 PROCEDURE — 76813 OB US NUCHAL MEAS 1 GEST: CPT | Performed by: OBSTETRICS & GYNECOLOGY

## 2025-02-12 PROCEDURE — 76801 OB US < 14 WKS SINGLE FETUS: CPT | Performed by: OBSTETRICS & GYNECOLOGY

## 2025-02-12 PROCEDURE — 36415 COLL VENOUS BLD VENIPUNCTURE: CPT | Performed by: OBSTETRICS & GYNECOLOGY

## 2025-02-12 RX ORDER — ACETAMINOPHEN 80 MG/1
80 TABLET, CHEWABLE ORAL EVERY 4 HOURS PRN
COMMUNITY

## 2025-02-12 NOTE — PROGRESS NOTES
"Sandy presents today for a genetic screening ultrasound.  She has a history of 4 prior pregnancies 3 of which were full-term vaginal deliveries 1 of which was a miscarriage not requiring surgical intervention.  Other than history of depression, she has no other significant contributory medical, surgical, substance use, or family history.  A review of systems is otherwise negative.    We discussed the options for genetic screening, including but not limited to first trimester screening, second trimester screening, combined first and second trimester screening, noninvasive prenatal screening (NIPS) for patients at high risk and diagnostic screening through the use of CVS and amniocentesis. We discussed the risks and benefits of each approach including the sensitivities and false positive rates as well as the difference between a screening test and a diagnostic test. At the conclusion of our discussion the patient elected noninvasive prenatal screening utilizing the MaterniT 21 plus test. The patient had this blood work drawn in the office.   The results should be available in approximately 7-10 days.    We discussed follow-up in detail and I recommend an anatomy ultrasound be scheduled for 20 weeks gestation.    Thank you very much for allowing us to participate in the care of this very nice patient. Should you have any questions, please do not hesitate to contact me.    Portions of the record may have been created with voice recognition software. Occasional wrong word or \"sound a like\" substitutions may have occurred due to the inherent limitations of voice recognition software. Read the chart carefully and recognize, using context, where substitutions have occurred.  "

## 2025-02-14 ENCOUNTER — INITIAL PRENATAL (OUTPATIENT)
Age: 35
End: 2025-02-14
Payer: COMMERCIAL

## 2025-02-14 VITALS — BODY MASS INDEX: 28.24 KG/M2 | DIASTOLIC BLOOD PRESSURE: 62 MMHG | WEIGHT: 144.6 LBS | SYSTOLIC BLOOD PRESSURE: 118 MMHG

## 2025-02-14 DIAGNOSIS — Z36.9 ENCOUNTER FOR ANTENATAL SCREENING OF MOTHER: ICD-10-CM

## 2025-02-14 DIAGNOSIS — F32.A DEPRESSION AFFECTING PREGNANCY IN SECOND TRIMESTER, ANTEPARTUM: ICD-10-CM

## 2025-02-14 DIAGNOSIS — O21.9 NAUSEA AND VOMITING DURING PREGNANCY: ICD-10-CM

## 2025-02-14 DIAGNOSIS — O99.342 DEPRESSION AFFECTING PREGNANCY IN SECOND TRIMESTER, ANTEPARTUM: ICD-10-CM

## 2025-02-14 DIAGNOSIS — Z34.82 PRENATAL CARE, SUBSEQUENT PREGNANCY, SECOND TRIMESTER: Primary | ICD-10-CM

## 2025-02-14 DIAGNOSIS — Z12.4 SCREENING FOR CERVICAL CANCER: ICD-10-CM

## 2025-02-14 DIAGNOSIS — Z33.1 PREGNANT STATE, INCIDENTAL: ICD-10-CM

## 2025-02-14 LAB
SL AMB  POCT GLUCOSE, UA: NORMAL
SL AMB POCT URINE PROTEIN: NORMAL

## 2025-02-14 PROCEDURE — 81002 URINALYSIS NONAUTO W/O SCOPE: CPT | Performed by: OBSTETRICS & GYNECOLOGY

## 2025-02-14 PROCEDURE — 99214 OFFICE O/P EST MOD 30 MIN: CPT | Performed by: OBSTETRICS & GYNECOLOGY

## 2025-02-14 RX ORDER — ONDANSETRON 4 MG/1
4 TABLET, ORALLY DISINTEGRATING ORAL EVERY 8 HOURS PRN
Qty: 30 TABLET | Refills: 1 | Status: SHIPPED | OUTPATIENT
Start: 2025-02-14

## 2025-02-14 RX ORDER — SERTRALINE HYDROCHLORIDE 25 MG/1
25 TABLET, FILM COATED ORAL DAILY
Qty: 30 TABLET | Refills: 11 | Status: SHIPPED | OUTPATIENT
Start: 2025-02-14

## 2025-02-14 NOTE — PATIENT INSTRUCTIONS
Pap today, already had GC/CT.  Continue with prenatal vitamins.   For AFP 16-18wks.   For elen u/s 4/15.   Does not want BTL at end of pregnancy.   Patient verbalized understanding of today's discussion with all questions and concerns addressed to her satisfaction.

## 2025-02-14 NOTE — PROGRESS NOTES
1ST OB VISIT  Pn1 Labs: Completed 25   [id number] labs pending  Nuchal completed       P:  3  LMP: 24   JELANI: 2025  Last Annual Visit: years ago  Last Pap: 16 NILM/HPV neg    Pap IS  indicated today  GC/CH : Neg results 25  Blood Type: A Positive    AFP ordered today    Has Blue Folder     Q's/Concerns: Would like to discuss coming off Wellbutrin in 2024- and sadness has returned    Denies Leaking of Fluid, vaginal bleeding, cramping

## 2025-02-14 NOTE — PROGRESS NOTES
Assessment/Plan:      Pregnancy at 13 and 2/7 weeks    Pap today, already had GC/CT.  Continue with prenatal vitamins.   For AFP 16-18wks.   For elen u/s 4/15.   Does not want BTL at end of pregnancy.   Patient verbalized understanding of today's discussion with all questions and concerns addressed to her satisfaction.           Initial labs drawn. Yes  Patient is taking Prenatal vitamins.  Problem list reviewed and updated.  AFP3 discussed: requested.  Role of ultrasound in pregnancy discussed; fetal survey: requested.  Amniocentesis discussed: not indicated.  Follow up in 4 weeks.          Subjective     Sandy Alcantar is being seen today for her first obstetrical visit.  This is a planned pregnancy. She is at 13w2d gestation. Her obstetrical history is significant for  nothing!  (Did have polyhydramnios for 2nd pregnancy . Relationship with FOB: significant other, living together. Patient does intend to breast feed. Pregnancy history fully reviewed.      The following portions of the patient's history were reviewed and updated as appropriate: allergies, current medications, past family history, past medical history, past social history, past surgical history, and problem list.        Review of Systems    Pertinent items are noted in HPI.        Objective     /62   Wt 65.6 kg (144 lb 9.6 oz)   LMP 11/13/2024   BMI 28.24 kg/m²     General Appearance:    Alert, cooperative, no distress, appears stated age   Head:    Normocephalic, without obvious abnormality, atraumatic   Eyes:    PERRL, conjunctiva/corneas clear, EOM's intact, fundi     benign, both eyes   Ears:    Normal TM's and external ear canals, both ears   Nose:   Nares normal, septum midline, mucosa normal, no drainage    or sinus tenderness   Throat:   Lips, mucosa, and tongue normal; teeth and gums normal   Neck:   Supple, symmetrical, trachea midline, no adenopathy;     thyroid:  no enlargement/tenderness/nodules; no carotid    bruit or JVD    Back:     Symmetric, no curvature, ROM normal, no CVA tenderness   Lungs:     Clear to auscultation bilaterally, respirations unlabored   Chest Wall:    No tenderness or deformity    Heart:    Regular rate and rhythm, S1 and S2 normal, no murmur, rub   or gallop   Breast Exam:    No tenderness, masses, or nipple abnormality   Abdomen:     Soft, non-tender, bowel sounds active all four quadrants,     no masses, no organomegaly   Genitalia:    Normal female without lesion, discharge or tenderness   Rectal:    Normal tone, no masses or tenderness; guaiac negative stool   Extremities:   Extremities normal, atraumatic, no cyanosis or edema   Pulses:   2+ and symmetric all extremities   Skin:   Skin color, texture, turgor normal, no rashes or lesions   Lymph nodes:   Cervical, supraclavicular, and axillary nodes normal   Neurologic:   CNII-XII intact, normal strength, sensation and reflexes     throughout         FHR noted at 154 bpm.    Vaginal bleeding Not Noted.   Vaginal discharge: whitish.  .      51% of 20 min visit spent on counseling and coordination of care.             KYLE Espinal MD, FACOG

## 2025-02-16 LAB
CFDNA.FET/CFDNA.TOTAL SFR FETUS: NORMAL %
CITATION REF LAB TEST: NORMAL
FET 13+18+21+X+Y ANEUP PLAS.CFDNA: NEGATIVE
FET CHR 21 TS PLAS.CFDNA QL: NEGATIVE
FET CHR 21 TS PLAS.CFDNA QL: NEGATIVE
FET MS X RISK WBC.DNA+CFDNA QL: NOT DETECTED
FET SEX PLAS.CFDNA DOSAGE CFDNA: NORMAL
FET TS 13 RISK PLAS.CFDNA QL: NEGATIVE
FET X + Y ANEUP RISK PLAS.CFDNA SEQ-IMP: NOT DETECTED
GA EST FROM CONCEPTION DATE: NORMAL D
GESTATIONAL AGE > 9:: YES
LAB DIRECTOR NAME PROVIDER: NORMAL
LAB DIRECTOR NAME PROVIDER: NORMAL
LABORATORY COMMENT REPORT: NORMAL
LIMITATIONS OF THE TEST: NORMAL
NEGATIVE PREDICTIVE VALUE: NORMAL
PERFORMANCE CHARACTERISTICS: NORMAL
POSITIVE PREDICTIVE VALUE: NORMAL
REF LAB TEST METHOD: NORMAL
SL AMB NOTE:: NORMAL
TEST PERFORMANCE INFO SPEC: NORMAL

## 2025-02-18 ENCOUNTER — RESULTS FOLLOW-UP (OUTPATIENT)
Dept: PERINATAL CARE | Facility: OTHER | Age: 35
End: 2025-02-18

## 2025-02-18 NOTE — RESULT ENCOUNTER NOTE
I have reviewed the results of the NIPS which are low risk.  Please call patient and notify her of these reassuring results if she has not viewed on MyChart. Please ensure she is notified of recommendation of MSAFP to be ordered and followed up through her primary Obstetrician's office.      Thank you, Michael Fitzpatrick MD

## 2025-02-20 LAB
CYTOLOGIST CVX/VAG CYTO: NORMAL
DX ICD CODE: NORMAL
HPV GENOTYPE REFLEX: NORMAL
HPV I/H RISK 4 DNA CVX QL PROBE+SIG AMP: NEGATIVE
OTHER STN SPEC: NORMAL
PATH REPORT.FINAL DX SPEC: NORMAL
SL AMB NOTE:: NORMAL
SL AMB SPECIMEN ADEQUACY: NORMAL
SL AMB TEST METHODOLOGY: NORMAL

## 2025-02-21 ENCOUNTER — NURSE TRIAGE (OUTPATIENT)
Age: 35
End: 2025-02-21

## 2025-02-21 NOTE — TELEPHONE ENCOUNTER
"14w2d,  Ob with persistent headache for 4 days. Has been taking 1 tylenol every 8 hours. Frontal headache, feels pressure behind eyes and does note stiff neck as well. Does have a history of headache but never diagnosed with migraines. States when the headaches get severe, she did vomit but denies any vision changes, abdominal pain, swelling or fevers. Discussed with patient taking extra strength tylenol 1000mg every 8 hours as needed for a max of 3000mg daily. Encouraged a cup of caffeine, hydration, rest, cold pack to her head and dimming lights. No fetal concerns. Also reviewed 400mg magnesium and 400mg riboflavin for headache prevention in pregnancy.    ESC sent to Dr Espinal. - if no improvement with meds, ED or urgent care.     Called patient and informed of response and recommendations. Advised if neck is stiff and she cannot touch her chin to her chest, should go to ED. Verbalized understanding. No further questions.     Reason for Disposition   Mild Headache    Answer Assessment - Initial Assessment Questions  1. LOCATION: \"Where does it hurt?\"       Frontal area, pressure behind eyes   2. ONSET: \"When did the headache start?\" (e.g., minutes, hours or days)       4 days ago   3. PATTERN: \"Does the pain come and go, or has it been constant since it started?\"      Constant - does worsen at times   4. SEVERITY: \"How bad is the pain?\" and \"What does it keep you from doing?\"       Worse in the evening after work   5. RECURRENT SYMPTOM: \"Have you ever had headaches before?\" If Yes, ask: \"When was the last time?\" and \"What happened that time?\"       History of headaches   6. CAUSE: \"What do you think is causing the headache?\"      Unsure   7. MIGRAINE: \"Have you been diagnosed with migraine headaches?\" If Yes, ask: \"Is this headache similar?\"       Denies   8. HEAD INJURY: \"Has there been any recent injury to the head?\"       Denies   9. OTHER SYMPTOMS: \"Do you have any other symptoms?\" (e.g., abdomen pain, " "blurred vision, fever, stiff neck; swelling of hands, face, or feet)      Stiff neck   10. PREGNANCY: \"How many weeks pregnant are you?\"        14w2d   11. JELANI: \"What date are you expecting to deliver?\"        08/20/2025    Protocols used: Pregnancy - Headache-Adult-[place]    "

## 2025-03-17 ENCOUNTER — ROUTINE PRENATAL (OUTPATIENT)
Age: 35
End: 2025-03-17
Payer: COMMERCIAL

## 2025-03-17 VITALS — BODY MASS INDEX: 30.78 KG/M2 | DIASTOLIC BLOOD PRESSURE: 60 MMHG | SYSTOLIC BLOOD PRESSURE: 106 MMHG | WEIGHT: 157.6 LBS

## 2025-03-17 DIAGNOSIS — Z34.82 PRENATAL CARE, SUBSEQUENT PREGNANCY, SECOND TRIMESTER: Primary | ICD-10-CM

## 2025-03-17 LAB
SL AMB  POCT GLUCOSE, UA: NORMAL
SL AMB POCT URINE PROTEIN: NORMAL

## 2025-03-17 PROCEDURE — 81002 URINALYSIS NONAUTO W/O SCOPE: CPT | Performed by: OBSTETRICS & GYNECOLOGY

## 2025-03-17 PROCEDURE — 99213 OFFICE O/P EST LOW 20 MIN: CPT | Performed by: OBSTETRICS & GYNECOLOGY

## 2025-03-17 NOTE — PROGRESS NOTES
17w5d  Pn1 Labs: Completed 1/28/25   [id number] labs completed  Nuchal completed  Level II scheduled  Last Pap/HPV neg results    AFP  order still active  Denies Leaking of Fluid, vaginal bleeding, contractions  +Fetal Movement

## 2025-03-17 NOTE — PROGRESS NOTES
+ flutter  No VB or cramping  Normal NT and low-risk NIPT; it's a girl!  Has AFP order  Level II scheduled

## 2025-03-21 ENCOUNTER — PATIENT MESSAGE (OUTPATIENT)
Age: 35
End: 2025-03-21

## 2025-03-22 LAB
2ND TRIMESTER 4 SCREEN SERPL-IMP: NORMAL
AFP ADJ MOM SERPL: 0.8
AFP INTERP AMN-IMP: NORMAL
AFP INTERP SERPL-IMP: NORMAL
AFP INTERP SERPL-IMP: NORMAL
AFP SERPL-MCNC: 35.4 NG/ML
AGE AT DELIVERY: 33.4 YR
GA METHOD: NORMAL
GA: 18 WEEKS
IDDM PATIENT QL: NO
MULTIPLE PREGNANCY: NO
NEURAL TUBE DEFECT RISK FETUS: NORMAL %

## 2025-03-26 ENCOUNTER — TELEPHONE (OUTPATIENT)
Age: 35
End: 2025-03-26

## 2025-03-26 NOTE — TELEPHONE ENCOUNTER
2ND TRIMESTER CHECK-IN CALL     Overall how are you doing? Patient states she is doing well.    Compliant with routine OB care appointments? Yes    Have you completed your 1st trimester labs? Yes    If you had NIPS with MFM, do you have a order for MSAFP? MSAFP has been completed.   Can be completed 15w-21w6d, ideally 16w-18w    Have you seen MFM and do you have your detailed US scheduled? Yes, scheduled 4/25/25.    Pregnancy Education-have you had a chance to review the classes offered and registered? Yes, patient is interested and plans to register for classes.    Additional Notes: offers no c/o at this time

## 2025-04-01 ENCOUNTER — RESULTS FOLLOW-UP (OUTPATIENT)
Age: 35
End: 2025-04-01

## 2025-04-14 ENCOUNTER — ROUTINE PRENATAL (OUTPATIENT)
Age: 35
End: 2025-04-14
Payer: COMMERCIAL

## 2025-04-14 VITALS — BODY MASS INDEX: 31.21 KG/M2 | SYSTOLIC BLOOD PRESSURE: 112 MMHG | DIASTOLIC BLOOD PRESSURE: 48 MMHG | WEIGHT: 159.8 LBS

## 2025-04-14 DIAGNOSIS — O21.9 NAUSEA AND VOMITING DURING PREGNANCY: ICD-10-CM

## 2025-04-14 DIAGNOSIS — G43.919 INTRACTABLE MIGRAINE WITHOUT STATUS MIGRAINOSUS, UNSPECIFIED MIGRAINE TYPE: ICD-10-CM

## 2025-04-14 DIAGNOSIS — Z34.82 PRENATAL CARE, SUBSEQUENT PREGNANCY, SECOND TRIMESTER: Primary | ICD-10-CM

## 2025-04-14 PROBLEM — Z3A.21 21 WEEKS GESTATION OF PREGNANCY: Status: ACTIVE | Noted: 2025-02-12

## 2025-04-14 PROBLEM — F33.1 MAJOR DEPRESSIVE DISORDER, RECURRENT, MODERATE (HCC): Status: ACTIVE | Noted: 2025-04-14

## 2025-04-14 PROBLEM — J30.2 SEASONAL ALLERGIC RHINITIS: Status: ACTIVE | Noted: 2025-04-14

## 2025-04-14 PROBLEM — F32.A DEPRESSION: Status: ACTIVE | Noted: 2025-04-14

## 2025-04-14 PROBLEM — F32.A DEPRESSION: Status: RESOLVED | Noted: 2025-04-14 | Resolved: 2025-04-14

## 2025-04-14 PROBLEM — F41.9 ANXIETY: Status: ACTIVE | Noted: 2025-04-14

## 2025-04-14 LAB
SL AMB  POCT GLUCOSE, UA: NORMAL
SL AMB POCT URINE PROTEIN: NORMAL

## 2025-04-14 PROCEDURE — 81002 URINALYSIS NONAUTO W/O SCOPE: CPT | Performed by: OBSTETRICS & GYNECOLOGY

## 2025-04-14 PROCEDURE — 99213 OFFICE O/P EST LOW 20 MIN: CPT | Performed by: OBSTETRICS & GYNECOLOGY

## 2025-04-14 RX ORDER — METOCLOPRAMIDE 10 MG/1
10 TABLET ORAL 3 TIMES DAILY PRN
Qty: 90 TABLET | Refills: 0 | Status: SHIPPED | OUTPATIENT
Start: 2025-04-14 | End: 2025-05-14

## 2025-04-14 RX ORDER — ONDANSETRON 4 MG/1
4 TABLET, ORALLY DISINTEGRATING ORAL EVERY 8 HOURS PRN
Qty: 30 TABLET | Refills: 1 | Status: SHIPPED | OUTPATIENT
Start: 2025-04-14

## 2025-04-14 NOTE — PROGRESS NOTES
Good FM  No VB  Struggling with migraines  Discussed headache cocktail; reglan and zofran rx's sent  Level II scheduled  PDS 23; cannot tolerate zoloft yet due to above - will try again when symptoms improve  She and partner agree she is safe and has appropriate support

## 2025-04-14 NOTE — PROGRESS NOTES
21w5d  AFP negative  Level II scheduled 4/25/25    Reports headaches can last 3-4 days with occasional vomiting-no relief with over the counter medications  Denies Leaking of Fluid, vaginal bleeding, contractions  After exertion reports having lower abdominal pain- hx: Abdominoplasty.     EPDS: 23

## 2025-04-21 ENCOUNTER — PATIENT MESSAGE (OUTPATIENT)
Age: 35
End: 2025-04-21

## 2025-04-21 ENCOUNTER — NURSE TRIAGE (OUTPATIENT)
Age: 35
End: 2025-04-21

## 2025-04-21 NOTE — TELEPHONE ENCOUNTER
Regardinw pain on right side under ribs  ----- Message from Edith GRANDE sent at 2025 10:16 AM EDT -----  Pt. Sent a myc message that since Saturday she has been having persistent pain on her right side under her ribs. No other symptoms. She is 22w.

## 2025-04-21 NOTE — TELEPHONE ENCOUNTER
"FOLLOW UP:   Epic Secure Chat sent to Dr Chaves.   [employer] Collar Secure Chat received: 1000mg tylenol, belly binder, oral hydration, position changes, if doesn't improve or is unchanged go to ED     Pt was contacted and notified of the providers recommendations, pt verbalized understanding, no further questions.     REASON FOR CONVERSATION: Pregnancy Problem    SYMPTOMS: 2/10 stabbing pain under the right rib, and pain near the shoulder blade yesterday.     OTHER: Pt calling in she's 22w5d , pt saying she has a stabbing pain 2/10, under the right rib pain.  Pt saying that the pain went to her shoulder blade but the pain has resolved in the shoulder blade. Sitting worsens the pain. Pt reports +FM. Pt denies , back pain, contractions, diarrhea, fever, headache, urination pain, vaginal bleeding, vaginal discharge, vomiting    DISPOSITION: home care     Answer Assessment - Initial Assessment Questions  1. LOCATION: \"Where does it hurt?\"       Right side of abdomen under right rib   2. RADIATION: \"Does the pain shoot anywhere else?\" (e.g., chest, back)      Pt saying that the pain went to her shoulder blade but the pain has resolved in the shoulder blade.   3. ONSET: \"When did the pain begin?\" (Minutes, hours or days ago)       Pts pain started Saturday   4. SUDDEN: \"Gradual or sudden onset?\"      Suddenly   5. PATTERN: \"Does the pain come and go, or has it been constant since it started?\"       Constant   6. SEVERITY: \"How bad is the pain?\" \"What does it keep you from doing?\"  (e.g., Scale 1-10; mild, moderate, or severe)      2/10   7. RECURRENT SYMPTOM: \"Have you ever had this type of stomach pain before?\" If Yes, ask: \"When was the last time?\" and \"What happened that time?\"       Pt saying she had this pain with her last pregnancy   8. CAUSE: \"What do you think is causing the stomach pain?      Pt unsure   9. RELIEVING/AGGRAVATING FACTORS: \"What makes it better or worse?\" (e.g., antacids, bowel movement, movement)      " "Sitting worsens the pain.   10. FETAL MOVEMENT: \"Has the baby's movement decreased or changed significantly from normal?\"        Pt reporting +FM   11. OTHER SYMPTOMS: \"Do you have any other symptoms?\" (e.g., back pain, contractions, diarrhea, fever, headache, urination pain, vaginal bleeding, vaginal discharge, vomiting)        Pt denies , back pain, contractions, diarrhea, fever, headache, urination pain, vaginal bleeding, vaginal discharge, vomiting  12. JELANI: \"What date are you expecting to deliver?\"        8/20/25    Protocols used: Pregnancy - Abdominal Pain Greater Than 20 Weeks EGA-Adult-[place]    "

## 2025-04-25 ENCOUNTER — ROUTINE PRENATAL (OUTPATIENT)
Dept: PERINATAL CARE | Facility: OTHER | Age: 35
End: 2025-04-25
Attending: OBSTETRICS & GYNECOLOGY
Payer: COMMERCIAL

## 2025-04-25 VITALS
DIASTOLIC BLOOD PRESSURE: 58 MMHG | WEIGHT: 164 LBS | HEIGHT: 60 IN | BODY MASS INDEX: 32.2 KG/M2 | HEART RATE: 77 BPM | SYSTOLIC BLOOD PRESSURE: 96 MMHG

## 2025-04-25 DIAGNOSIS — Z36.3 ENCOUNTER FOR ANTENATAL SCREENING FOR MALFORMATION USING ULTRASOUND: ICD-10-CM

## 2025-04-25 DIAGNOSIS — Z3A.23 23 WEEKS GESTATION OF PREGNANCY: Primary | ICD-10-CM

## 2025-04-25 DIAGNOSIS — Z36.86 ENCOUNTER FOR ANTENATAL SCREENING FOR CERVICAL LENGTH: ICD-10-CM

## 2025-04-25 PROCEDURE — 76805 OB US >/= 14 WKS SNGL FETUS: CPT | Performed by: OBSTETRICS & GYNECOLOGY

## 2025-04-25 PROCEDURE — 76817 TRANSVAGINAL US OBSTETRIC: CPT | Performed by: OBSTETRICS & GYNECOLOGY

## 2025-04-25 PROCEDURE — 99213 OFFICE O/P EST LOW 20 MIN: CPT | Performed by: OBSTETRICS & GYNECOLOGY

## 2025-04-25 RX ORDER — DIPHENHYDRAMINE HCL 25 MG
25 CAPSULE ORAL EVERY 6 HOURS PRN
Status: ON HOLD | COMMUNITY

## 2025-04-25 NOTE — PROGRESS NOTES
The patient was seen today for an ultrasound.  Please see ultrasound report (located under Ob Procedures) for additional details.   Thank you very much for allowing us to participate in the care of this very nice patient.  Should you have any questions, please do not hesitate to contact me.     Michael Fitzpatrick MD FACOG  Attending Physician, Maternal-Fetal Medicine  [facility]

## 2025-04-25 NOTE — PROGRESS NOTES
Ultrasound Probe Disinfection    A transvaginal ultrasound was performed.   Prior to use, disinfection was performed with High Level Disinfection Process ([employer]).  Probe serial number [id number] was used.    Shelly Cochran  04/25/25  12:47 PM

## 2025-05-04 ENCOUNTER — HOSPITAL ENCOUNTER (OUTPATIENT)
Facility: HOSPITAL | Age: 35
Discharge: HOME | End: 2025-05-05
Attending: OBSTETRICS & GYNECOLOGY | Admitting: OBSTETRICS & GYNECOLOGY
Payer: COMMERCIAL

## 2025-05-04 ENCOUNTER — NURSE TRIAGE (OUTPATIENT)
Dept: OTHER | Facility: OTHER | Age: 35
End: 2025-05-04

## 2025-05-04 VITALS
HEIGHT: 60 IN | SYSTOLIC BLOOD PRESSURE: 114 MMHG | WEIGHT: 164 LBS | RESPIRATION RATE: 17 BRPM | BODY MASS INDEX: 32.2 KG/M2 | DIASTOLIC BLOOD PRESSURE: 58 MMHG | TEMPERATURE: 98 F | HEART RATE: 71 BPM

## 2025-05-04 PROCEDURE — 81001 URINALYSIS AUTO W/SCOPE: CPT | Performed by: OBSTETRICS & GYNECOLOGY

## 2025-05-05 PROBLEM — O26.852 SPOTTING AFFECTING PREGNANCY IN SECOND TRIMESTER: Status: ACTIVE | Noted: 2025-05-05

## 2025-05-05 LAB
BACTERIA UR QL AUTO: ABNORMAL /HPF
BILIRUB UR QL STRIP: NEGATIVE
CLARITY UR: CLEAR
COLOR UR: ABNORMAL
GLUCOSE UR STRIP-MCNC: NEGATIVE MG/DL
HGB UR QL STRIP.AUTO: ABNORMAL
KETONES UR STRIP-MCNC: NEGATIVE MG/DL
LEUKOCYTE ESTERASE UR QL STRIP: NEGATIVE
NITRITE UR QL STRIP: NEGATIVE
NON-SQ EPI CELLS URNS QL MICRO: ABNORMAL /HPF
PH UR STRIP.AUTO: 7 [PH]
PROT UR STRIP-MCNC: NEGATIVE MG/DL
RBC #/AREA URNS AUTO: ABNORMAL /HPF
SP GR UR STRIP.AUTO: <1.005 (ref 1–1.03)
UROBILINOGEN UR STRIP-ACNC: <2 MG/DL
WBC #/AREA URNS AUTO: ABNORMAL /HPF

## 2025-05-05 PROCEDURE — 76815 OB US LIMITED FETUS(S): CPT | Performed by: OBSTETRICS & GYNECOLOGY

## 2025-05-05 PROCEDURE — 99213 OFFICE O/P EST LOW 20 MIN: CPT | Performed by: OBSTETRICS & GYNECOLOGY

## 2025-05-05 PROCEDURE — 76817 TRANSVAGINAL US OBSTETRIC: CPT | Performed by: OBSTETRICS & GYNECOLOGY

## 2025-05-05 PROCEDURE — 99215 OFFICE O/P EST HI 40 MIN: CPT

## 2025-05-05 NOTE — ASSESSMENT & PLAN NOTE
- c/o pink tinge on toilet paper with wiping after voiding, no rigo blood, not noted on further voids   - denies recent intercourse or trauma, no dysuria, blood in urine   - anatomy u/s done 4/25/2025 - no previa, cervix normal, fetal anatomy normal     - spec exam no evidence of blood - wet mount no yeast or clue cells noted.   - transvaginal u/s - cervix 3.95cm, no funnelling   - cervix closed on exam   - TAUS: breech, BLESSING 16.3cm   - NST - 130FHT, mod variability, 10 x 10 accels, no decels     - noted decreased FM today and occasional abdominal tightening   - no ctx on toco   - once here patient noting normal FM, FM noted on u/s     - reviewed with pt, no signs bleeding.  Recom send UA for possible UTI.  If concern will do reflex urine culture and consider antibiotics.   - patient reassured by testing - will discharge home as urinalysis can take > 1 hour and no further symptoms.     - I will f/u on UA via [employer]ual[employer] to patient.

## 2025-05-05 NOTE — TELEPHONE ENCOUNTER
"FOLLOW UP: None    REASON FOR CONVERSATION: Vaginal Bleeding - Pregnant    SYMPTOMS: pink spotting, 2 episodes of \"significant cramping\", decreased fetal movement    ESC sent to on call-  Agreed patient should be evaluated in L&D    OTHER: none    DISPOSITION: Go to LD Now      Reason for Disposition   Baby moving less today (e.g., kick count < 5 in 1 hour or < 10 in 2 hours)    Answer Assessment - Initial Assessment Questions  1. ONSET: \"When did this bleeding start?\"         \"Just now\"    2. BLEEDING SEVERITY: \"Describe the bleeding that you are having.\" \"How much bleeding is there?\"       Mild- spotting on toilet tissue    3. ABDOMEN PAIN: \"Do you have any pain?\" \"How bad is the pain?\"  (e.g., Scale 0-10; none, mild, moderate, or severe)      2 episodes of \"significant cramping\" today    4. PREGNANCY: \"Do you know how many weeks or months pregnant you are?\"       24w4d    5. JELANI: \"What date are you expecting to deliver?\"      8/20    6. FETAL MOVEMENT: \"Has the baby's movement decreased or changed significantly from normal?\"      Decreased    Protocols used: Pregnancy - Vaginal Bleeding Greater Than 20 Weeks EGA-Adult-    "

## 2025-05-05 NOTE — PROGRESS NOTES
Triage Progress Note - OB/GYN   Name: Sandy Alcantar 35 y.o. female I MRN: [id number]  Unit/Bed#: LD TRIAGE 2-01 I Date of Admission: 5/4/2025   Date of Service: 5/5/2025 I Hospital Day: 0     Assessment & Plan  Spotting affecting pregnancy in second trimester   - c/o pink tinge on toilet paper with wiping after voiding, no rigo blood, not noted on further voids   - denies recent intercourse or trauma, no dysuria, blood in urine   - anatomy u/s done 4/25/2025 - no previa, cervix normal, fetal anatomy normal     - spec exam no evidence of blood - wet mount no yeast or clue cells noted.   - transvaginal u/s - cervix 3.95cm, no funnelling   - cervix closed on exam   - TAUS: breech, BLESSING 16.3cm   - NST - 130FHT, mod variability, 10 x 10 accels, no decels     - noted decreased FM today and occasional abdominal tightening   - no ctx on toco   - once here patient noting normal FM, FM noted on u/s     - reviewed with pt, no signs bleeding.  Recom send UA for possible UTI.  If concern will do reflex urine culture and consider antibiotics.   - patient reassured by testing - will discharge home as urinalysis can take > 1 hour and no further symptoms.     - I will f/u on UA via MyChart to patient.      OB L&D Progress Note  Subjective   C/o pink tinge to discharge noted on toilet paper after urinating today around 8:30PM.  Denies rigo blood.  No dysuria or urinary symptoms.  Denies recent intercourse.    Today occasional cramping but less than 1x/hour and mild.  Feels baby not moving as much today as before, but admits had busy day today and maybe didn't notice.    Objective :  Temp:  [98 °F (36.7 °C)] 98 °F (36.7 °C)  HR:  [71] 71  BP: (114)/(58) 114/58  Resp:  [17] 17    Physical Exam  Cardiovascular:      Rate and Rhythm: Normal rate.   Pulmonary:      Effort: Pulmonary effort is normal.      Breath sounds: Normal breath sounds.   Abdominal:      Palpations: Abdomen is soft.      Tenderness: There is no abdominal  tenderness (Gravid).   Genitourinary:     Vagina: Normal.      Uterus: Enlarged. Not tender.    Neurological:      Mental Status: She is alert.   Psychiatric:         Mood and Affect: Mood normal.         Behavior: Behavior normal.     SSE: no blood, small physiologic appearing discharge, cervix closed, no pooling noted.  Cervix: closed/long/high

## 2025-05-12 ENCOUNTER — ROUTINE PRENATAL (OUTPATIENT)
Age: 35
End: 2025-05-12
Payer: COMMERCIAL

## 2025-05-12 VITALS — BODY MASS INDEX: 32.42 KG/M2 | DIASTOLIC BLOOD PRESSURE: 60 MMHG | SYSTOLIC BLOOD PRESSURE: 100 MMHG | WEIGHT: 166 LBS

## 2025-05-12 DIAGNOSIS — Z11.3 SCREENING FOR STDS (SEXUALLY TRANSMITTED DISEASES): ICD-10-CM

## 2025-05-12 DIAGNOSIS — Z34.82 PRENATAL CARE, SUBSEQUENT PREGNANCY, SECOND TRIMESTER: Primary | ICD-10-CM

## 2025-05-12 LAB
SL AMB  POCT GLUCOSE, UA: NORMAL
SL AMB POCT URINE PROTEIN: NORMAL

## 2025-05-12 PROCEDURE — 81002 URINALYSIS NONAUTO W/O SCOPE: CPT | Performed by: OBSTETRICS & GYNECOLOGY

## 2025-05-12 PROCEDURE — 99213 OFFICE O/P EST LOW 20 MIN: CPT | Performed by: OBSTETRICS & GYNECOLOGY

## 2025-05-12 NOTE — PROGRESS NOTES
Pt reports +FM.   Had to go to triage last week for decreased FM but baby just flipped.  Had pink spotting associated with it but all better now.  Denies any further spotting or VB or LOF.  Not sure about BH ctx but does have some every now and then.      Has sunburn from yesterday.  Aloe to area advised.     For 28wk labs.  Reassured pt of 4/25 u/s findings of HC @ 38%.  If measurements are off, will consider u/s 32-36wks.     PTK, JOSUE, wt gain, vit C rich foods discussed.

## 2025-05-29 ENCOUNTER — RESULTS FOLLOW-UP (OUTPATIENT)
Age: 35
End: 2025-05-29

## 2025-05-29 LAB
BASOPHILS # BLD AUTO: 0 X10E3/UL (ref 0–0.2)
BASOPHILS NFR BLD AUTO: 1 %
EOSINOPHIL # BLD AUTO: 0.2 X10E3/UL (ref 0–0.4)
EOSINOPHIL NFR BLD AUTO: 2 %
ERYTHROCYTE [DISTWIDTH] IN BLOOD BY AUTOMATED COUNT: 12.9 % (ref 11.7–15.4)
GLUCOSE 1H P 50 G GLC PO SERPL-MCNC: 97 MG/DL (ref 70–139)
HCT VFR BLD AUTO: 33.7 % (ref 34–46.6)
HGB BLD-MCNC: 11.3 G/DL (ref 11.1–15.9)
IMM GRANULOCYTES # BLD: 0 X10E3/UL (ref 0–0.1)
IMM GRANULOCYTES NFR BLD: 0 %
IRON SATN MFR SERPL: 16 % (ref 15–55)
IRON SERPL-MCNC: 78 UG/DL (ref 27–159)
LYMPHOCYTES # BLD AUTO: 1.4 X10E3/UL (ref 0.7–3.1)
LYMPHOCYTES NFR BLD AUTO: 17 %
MCH RBC QN AUTO: 30.7 PG (ref 26.6–33)
MCHC RBC AUTO-ENTMCNC: 33.5 G/DL (ref 31.5–35.7)
MCV RBC AUTO: 92 FL (ref 79–97)
MONOCYTES # BLD AUTO: 0.5 X10E3/UL (ref 0.1–0.9)
MONOCYTES NFR BLD AUTO: 6 %
NEUTROPHILS # BLD AUTO: 6.5 X10E3/UL (ref 1.4–7)
NEUTROPHILS NFR BLD AUTO: 74 %
PLATELET # BLD AUTO: 170 X10E3/UL (ref 150–450)
RBC # BLD AUTO: 3.68 X10E6/UL (ref 3.77–5.28)
RETICS/RBC NFR AUTO: 1.8 % (ref 0.6–2.6)
RPR SER QL: NON REACTIVE
TIBC SERPL-MCNC: 476 UG/DL (ref 250–450)
UIBC SERPL-MCNC: 398 UG/DL (ref 131–425)
WBC # BLD AUTO: 8.6 X10E3/UL (ref 3.4–10.8)

## 2025-06-04 ENCOUNTER — CLINICAL SUPPORT (OUTPATIENT)
Age: 35
End: 2025-06-04

## 2025-06-04 DIAGNOSIS — Z32.2 ENCOUNTER FOR CHILDBIRTH INSTRUCTION: Primary | ICD-10-CM

## 2025-06-06 ENCOUNTER — PATIENT MESSAGE (OUTPATIENT)
Age: 35
End: 2025-06-06

## 2025-06-06 ENCOUNTER — NURSE TRIAGE (OUTPATIENT)
Dept: OTHER | Facility: OTHER | Age: 35
End: 2025-06-06

## 2025-06-07 NOTE — TELEPHONE ENCOUNTER
"REASON FOR CONVERSATION: Vaginal Bleeding    SYMPTOMS: ESC sent to on call provider-  Reports around 10pm she used the bathroom and noticed a small amount of pink spotting. Denies any cramping. notes she has not had intercourse or had an exam in the past few days. Does report having a mild headache today that resolved with tylenol. Also with b/l LE edema. Notes her left leg is more swollen then the right. Normal fetal movement.     OTHER HEALTH INFORMATION: 29w2d, JELANI     PROTOCOL DISPOSITION:  now    CARE ADVICE PROVIDED: Per on call-  Okay to monitor spotting - if continues or develops pain/contractions or baby not moving normally, would recommend coming in for evaluation? For leg swelling patient should elevate them evenly and see if improves.     PRACTICE FOLLOW-UP: none          Reason for Disposition   [1] Pregnant 24 to 36 weeks () AND [2] pinkish or brownish mucous discharge  (Exception: Single episode of faint spotting when wiping, or slight spotting after intercourse or pelvic exam.)    Answer Assessment - Initial Assessment Questions  1. ONSET: \"When did this bleeding start?\"         About 10pm this evening    2. BLEEDING SEVERITY: \"Describe the bleeding that you are having.\" \"How much bleeding is there?\"       Mild, light pink spotting    3. ABDOMEN PAIN: \"Do you have any pain?\" \"How bad is the pain?\"  (e.g., Scale 0-10; none, mild, moderate, or severe)      Denies    4. PREGNANCY: \"Do you know how many weeks or months pregnant you are?\"       29w2d    5. JELANI: \"What date are you expecting to deliver?\"          6. FETAL MOVEMENT: \"Has the baby's movement decreased or changed significantly from normal?\"      Normal    10. OTHER SYMPTOMS: \"What other symptoms are you having with the bleeding?\" (e.g., leaking fluid from vagina, contractions)        Mild headache resolved with tylenol. Also b/l LE edema. Left leg more swollen then the right    Protocols used: Pregnancy - Vaginal " Bleeding Greater Than 20 Weeks EGA-Adult-AH

## 2025-06-09 ENCOUNTER — ROUTINE PRENATAL (OUTPATIENT)
Age: 35
End: 2025-06-09
Payer: COMMERCIAL

## 2025-06-09 VITALS — WEIGHT: 173 LBS | SYSTOLIC BLOOD PRESSURE: 98 MMHG | DIASTOLIC BLOOD PRESSURE: 54 MMHG | BODY MASS INDEX: 33.79 KG/M2

## 2025-06-09 DIAGNOSIS — Z03.74 FETAL GROWTH PROBLEM SUSPECTED BUT NOT FOUND: ICD-10-CM

## 2025-06-09 DIAGNOSIS — Z34.83 PRENATAL CARE, SUBSEQUENT PREGNANCY, THIRD TRIMESTER: Primary | ICD-10-CM

## 2025-06-09 LAB
SL AMB  POCT GLUCOSE, UA: NORMAL
SL AMB POCT URINE PROTEIN: NORMAL

## 2025-06-09 PROCEDURE — 81002 URINALYSIS NONAUTO W/O SCOPE: CPT | Performed by: OBSTETRICS & GYNECOLOGY

## 2025-06-09 PROCEDURE — 99213 OFFICE O/P EST LOW 20 MIN: CPT | Performed by: OBSTETRICS & GYNECOLOGY

## 2025-06-09 NOTE — PROGRESS NOTES
PN visit  29w5d  Level II US completed 4/25/25  28 week labs Completed  1hr GTT= 97  Increasing iron foods  Yellow Folder Given Today  Delivery Consent signed today  Plans to Breastfeed  Will let us know which pump she wants    * She has headaches, but not as frequent or severe *   acid reflux getting worse- Right side rib pain-    hx: Abdominoplasty.     Denies Leaking of Fluid, contractions  Spotting on 6/5 when wiping -pink on tissue, denies recent coitus  +Fetal Movement - feels like less movement

## 2025-06-09 NOTE — PROGRESS NOTES
Had one-time episode of pink spotting with wiping last week  Has variable areas of pain and discomfort - has abdominoplasty after last baby  Discussed pain control measures  + FM but different from last baby  S>D; growth sono ordered  1 hour gtt 97

## 2025-06-10 ENCOUNTER — TELEPHONE (OUTPATIENT)
Age: 35
End: 2025-06-10

## 2025-06-10 NOTE — TELEPHONE ENCOUNTER
Informed pt she is allowed 2 support partners no one under the age of 5 allowed in for MFM US pt understood. She just wanted to confirm  and Mother In-Law were allowed.

## 2025-06-11 NOTE — PATIENT INSTRUCTIONS
Thank you for choosing us for your  care today.  If you have any questions about your ultrasound or care, please do not hesitate to contact us or your primary obstetrician.        Some general instructions for your pregnancy are:    Exercise: Aim for 150 minutes per week of regular exercise.  Walking is great!  Nutrition: Choose healthy sources of calcium, iron, and protein.  Avoid ultraprocessed foods and added sugar.  Learn about Preeclampsia: preeclampsia is a common, potentially serious high blood pressure complication in pregnancy.  A blood pressure of 140mmHg (systolic or top number) or 90mmHg (diastolic or bottom number) should be evaluated by your doctor.  Aspirin is sometimes prescribed in early pregnancy to prevent preeclampsia in women with risk factors - ask your obstetrician if you should be on this medication.  For more resources, visit:  https://[email or web address].[email or web address]  If you smoke, please try to quit completely but also try to reduce your smoking by as much as possible (as soon as possible).  Do not vape.  Please also avoid cannabis products.  Other warning signs to watch out for in pregnancy or postpartum: chest pain, obstructed breathing or shortness of breath, seizures, thoughts of hurting yourself or your baby, bleeding, a painful or swollen leg, fever, or headache (see [facility] Warning Signs [facility]).  If these happen call 911.  Itching is also not normal in pregnancy and if you experience this, especially over your hands and feet, potentially worse at night, notify your doctors.     Kick Counts in Pregnancy   AMBULATORY CARE:   Kick counts  measure how much your baby is moving in your womb. A kick from your baby can be felt as a twist, turn, swish, roll, or jab. It is common to feel your baby kicking at 26 to 28 weeks of pregnancy. You may feel your baby kick as early as 20 weeks of pregnancy. You may want to start counting at 28 weeks.   Contact your  doctor immediately if:   You feel a change in the number of kicks or movements of your baby.      You feel fewer than 10 kicks within 2 hours.      You have questions or concerns about your baby's movements.     Why measure kick counts:  Your baby's movement may provide information about your baby's health. He or she may move less, or not at all, if there are problems. Your baby may move less if he or she is not getting enough oxygen or nutrition from the placenta. Do not smoke while you are pregnant. Smoking decreases the amount of oxygen that gets to your baby. Talk to your healthcare provider if you need help to quit smoking. Tell your healthcare provider as soon as you feel a change in your baby's movements.  When to measure kick counts:   Measure kick counts at the same time every day.       Measure kick counts when your baby is awake and most active. Your baby may be most active in the evening.     How to measure kick counts:  Check that your baby is awake before you measure kick counts. You can wake up your baby by lightly pushing on your belly, walking, or drinking something cold. Your healthcare provider may tell you different ways to measure kick counts. You may be told to do the following:  Use a chart or clock to keep track of the time you start and finish counting.      Sit in a chair or lie on your left side.      Place your hands on the largest part of your belly.      Count until you reach 10 kicks. Write down how much time it takes to count 10 kicks.      It may take 30 minutes to 2 hours to count 10 kicks. It should not take more than 2 hours to count 10 kicks.     Follow up with your doctor as directed:  Write down your questions so you remember to ask them during your visits.   © Copyright [employer] 2023 Information is for End User's use only and may not be sold, redistributed or otherwise used for commercial purposes.  The above information is an  only. It is not intended as  medical advice for individual conditions or treatments. Talk to your doctor, nurse or pharmacist before following any medical regimen to see if it is safe and effective for you.

## 2025-06-13 ENCOUNTER — ANCILLARY PROCEDURE (OUTPATIENT)
Dept: PERINATAL CARE | Facility: OTHER | Age: 35
End: 2025-06-13
Attending: OBSTETRICS & GYNECOLOGY
Payer: COMMERCIAL

## 2025-06-13 VITALS
WEIGHT: 172.4 LBS | HEIGHT: 60 IN | DIASTOLIC BLOOD PRESSURE: 62 MMHG | SYSTOLIC BLOOD PRESSURE: 122 MMHG | HEART RATE: 90 BPM | BODY MASS INDEX: 33.85 KG/M2

## 2025-06-13 DIAGNOSIS — O26.843 UTERINE SIZE-DATE DISCREPANCY IN THIRD TRIMESTER: ICD-10-CM

## 2025-06-13 DIAGNOSIS — Z3A.30 30 WEEKS GESTATION OF PREGNANCY: Primary | ICD-10-CM

## 2025-06-13 DIAGNOSIS — Z3A.31 31 WEEKS GESTATION OF PREGNANCY: ICD-10-CM

## 2025-06-13 PROCEDURE — 76816 OB US FOLLOW-UP PER FETUS: CPT | Performed by: STUDENT IN AN ORGANIZED HEALTH CARE EDUCATION/TRAINING PROGRAM

## 2025-06-13 PROCEDURE — 99212 OFFICE O/P EST SF 10 MIN: CPT | Performed by: STUDENT IN AN ORGANIZED HEALTH CARE EDUCATION/TRAINING PROGRAM

## 2025-06-13 NOTE — PROGRESS NOTES
"[facility]: Sandy Alcantar was seen today for fetal growth assessment ultrasound. See ultrasound report under \"Imaging\" tab.     She has a history of macrosomia and uterine size is measuring ahead of dates.  She had a normal Glucola.    Vitals:    25 0919   BP: 122/62   Pulse: 90     Problem List Items Addressed This Visit       Uterine size-date discrepancy in third trimester    She was reassured of the normal biometry and normal amniotic fluid volume.  There is no evidence of macrosomia or polyhydramnios.  Size date discrepancy is likely due to normal variation in fundal height.          Other Visit Diagnoses         30 weeks gestation of pregnancy    -  Primary          She can continue pregnancy with expectant management without further ultrasounds unless an additional indication or concern arises.    The total time dedicated to this patient encounter was 15 minutes (5 preparation, 5 face-to-face, and 5 documenting).    Please don't hesitate to contact our office with any concerns or questions.    -Edith Winkler MD  "

## 2025-06-13 NOTE — ASSESSMENT & PLAN NOTE
She was reassured of the normal biometry and normal amniotic fluid volume.  There is no evidence of macrosomia or polyhydramnios.  Size date discrepancy is likely due to normal variation in fundal height.

## 2025-06-18 ENCOUNTER — NURSE TRIAGE (OUTPATIENT)
Age: 35
End: 2025-06-18

## 2025-06-18 NOTE — TELEPHONE ENCOUNTER
"REASON FOR CONVERSATION: Vaginal Bleeding - Pregnant    SYMPTOMS: vaginal spotting, pink in color, started this morning. Denies abdominal pain.     OTHER HEALTH INFORMATION: OB 31w0d  with vaginal spotting this morning. Occurring with wiping, pink in color. Bleeding does not accumulate on underwear/pad. Denies abdominal pain. Did have cramping yesterday but this has subsided. Denies LOF. Patient just woke up, unsure of fetal movement at this time. No recent intercourse or pelvic exam.     ESC sent to Dr. Beaulieu.     PROTOCOL DISPOSITION: Home Care (overriding Go to LD Now (Or to Office With PCP Approval))    CARE ADVICE PROVIDED: Continue to monitor. If bleeding continues, fetal movement is decreased, having abdominal pain patient is to call back as she will need to be evaluated in triage.     PRACTICE FOLLOW-UP: N/A        Reason for Disposition   Pregnant 24 to 36 weeks () and pinkish or brownish mucous discharge    Answer Assessment - Initial Assessment Questions  1. ONSET: \"When did this bleeding start?\"         This morning  2. BLEEDING SEVERITY: \"Describe the bleeding that you are having.\" \"How much bleeding is there?\"       Pink in color when wiping  3. ABDOMEN PAIN: \"Do you have any pain?\" \"How bad is the pain?\"  (e.g., Scale 0-10; none, mild, moderate, or severe)      Did have cramping yesterday, last night had sharp burning pain on front of abdomen for short period of time. No abdominal pain today.  4. PREGNANCY: \"Do you know how many weeks or months pregnant you are?\"       31w0d  5. JELANI: \"What date are you expecting to deliver?\"      25  6. FETAL MOVEMENT: \"Has the baby's movement decreased or changed significantly from normal?\"      Patient just woke up   7. HIGH-RISK PREGNANCY:  \"Have been told by your doctor that you have a high-risk condition that can cause bleeding?\"  (e.g., placenta previa, vasa previa)       denies  10. OTHER SYMPTOMS: \"What other symptoms are you having with the " "bleeding?\" (e.g., leaking fluid from vagina, contractions)        denies    Protocols used: Pregnancy - Vaginal Bleeding Greater Than 20 Weeks EGA-Adult-[place]    "

## 2025-06-20 ENCOUNTER — NURSE TRIAGE (OUTPATIENT)
Dept: OTHER | Facility: OTHER | Age: 35
End: 2025-06-20

## 2025-06-20 ENCOUNTER — HOSPITAL ENCOUNTER (OUTPATIENT)
Facility: HOSPITAL | Age: 35
Discharge: HOME | End: 2025-06-20
Attending: OBSTETRICS & GYNECOLOGY | Admitting: OBSTETRICS & GYNECOLOGY
Payer: COMMERCIAL

## 2025-06-20 VITALS
SYSTOLIC BLOOD PRESSURE: 104 MMHG | DIASTOLIC BLOOD PRESSURE: 60 MMHG | RESPIRATION RATE: 18 BRPM | OXYGEN SATURATION: 97 % | HEART RATE: 80 BPM

## 2025-06-20 DIAGNOSIS — F32.A DEPRESSION AFFECTING PREGNANCY IN SECOND TRIMESTER, ANTEPARTUM: ICD-10-CM

## 2025-06-20 DIAGNOSIS — O99.342 DEPRESSION AFFECTING PREGNANCY IN SECOND TRIMESTER, ANTEPARTUM: ICD-10-CM

## 2025-06-20 PROBLEM — O46.93 VAGINAL BLEEDING IN PREGNANCY, THIRD TRIMESTER: Status: RESOLVED | Noted: 2025-06-20 | Resolved: 2025-06-20

## 2025-06-20 PROBLEM — Z3A.31 31 WEEKS GESTATION OF PREGNANCY: Status: ACTIVE | Noted: 2025-02-12

## 2025-06-20 PROBLEM — O46.93 VAGINAL BLEEDING IN PREGNANCY, THIRD TRIMESTER: Status: ACTIVE | Noted: 2025-06-20

## 2025-06-20 LAB
BACTERIA UR QL AUTO: ABNORMAL /HPF
BILIRUB UR QL STRIP: NEGATIVE
C TRACH DNA SPEC QL NAA+PROBE: NEGATIVE
CLARITY UR: CLEAR
COLOR UR: ABNORMAL
ERYTHROCYTE [DISTWIDTH] IN BLOOD BY AUTOMATED COUNT: 12.5 % (ref 11.6–15.1)
FIBRINOGEN PPP-MCNC: 444 MG/DL (ref 206–523)
GLUCOSE UR STRIP-MCNC: NEGATIVE MG/DL
HCT VFR BLD AUTO: 29.6 % (ref 34.8–46.1)
HGB BLD-MCNC: 10 G/DL (ref 11.5–15.4)
HGB UR QL STRIP.AUTO: ABNORMAL
KETONES UR STRIP-MCNC: NEGATIVE MG/DL
LEUKOCYTE ESTERASE UR QL STRIP: ABNORMAL
MCH RBC QN AUTO: 29.9 PG (ref 26.8–34.3)
MCHC RBC AUTO-ENTMCNC: 33.8 G/DL (ref 31.4–37.4)
MCV RBC AUTO: 88 FL (ref 82–98)
N GONORRHOEA DNA SPEC QL NAA+PROBE: NEGATIVE
NITRITE UR QL STRIP: NEGATIVE
NON-SQ EPI CELLS URNS QL MICRO: ABNORMAL /HPF
PH UR STRIP.AUTO: 7.5 [PH]
PLATELET # BLD AUTO: 157 THOUSANDS/UL (ref 149–390)
PMV BLD AUTO: 10.6 FL (ref 8.9–12.7)
PROT UR STRIP-MCNC: NEGATIVE MG/DL
RBC # BLD AUTO: 3.35 MILLION/UL (ref 3.81–5.12)
RBC #/AREA URNS AUTO: ABNORMAL /HPF
SP GR UR STRIP.AUTO: 1.02 (ref 1–1.03)
UROBILINOGEN UR STRIP-ACNC: <2 MG/DL
WBC # BLD AUTO: 9.48 THOUSAND/UL (ref 4.31–10.16)
WBC #/AREA URNS AUTO: ABNORMAL /HPF

## 2025-06-20 PROCEDURE — 99213 OFFICE O/P EST LOW 20 MIN: CPT

## 2025-06-20 PROCEDURE — 59025 FETAL NON-STRESS TEST: CPT | Performed by: OBSTETRICS & GYNECOLOGY

## 2025-06-20 PROCEDURE — 76818 FETAL BIOPHYS PROFILE W/NST: CPT | Performed by: OBSTETRICS & GYNECOLOGY

## 2025-06-20 PROCEDURE — NC001 PR NO CHARGE: Performed by: OBSTETRICS & GYNECOLOGY

## 2025-06-20 PROCEDURE — 85384 FIBRINOGEN ACTIVITY: CPT | Performed by: OBSTETRICS & GYNECOLOGY

## 2025-06-20 PROCEDURE — 87491 CHLMYD TRACH DNA AMP PROBE: CPT | Performed by: OBSTETRICS & GYNECOLOGY

## 2025-06-20 PROCEDURE — 81001 URINALYSIS AUTO W/SCOPE: CPT | Performed by: OBSTETRICS & GYNECOLOGY

## 2025-06-20 PROCEDURE — 85027 COMPLETE CBC AUTOMATED: CPT | Performed by: OBSTETRICS & GYNECOLOGY

## 2025-06-20 PROCEDURE — 76815 OB US LIMITED FETUS(S): CPT | Performed by: OBSTETRICS & GYNECOLOGY

## 2025-06-20 PROCEDURE — 87591 N.GONORRHOEAE DNA AMP PROB: CPT | Performed by: OBSTETRICS & GYNECOLOGY

## 2025-06-20 NOTE — PROCEDURES
angelito Miner  at 31w2d with an JELANI of 2025, by Last Menstrual Period, was seen at [facility] for the following procedure(s): $Procedure Type: BLESSING, BPP with NST, NST]    Nonstress Test  Reason for NST: Routine  Variability: Moderate  Decelerations: None  Accelerations: Yes  Acoustic Stimulator: No  Baseline: 120 BPM  Uterine Irritability: No  Contractions: Irregular (Rare)    4 Quadrant BLESSING  BLESSING Q1 (cm): 4.5 cm  BLESSING Q2 (cm): 4 cm  BLESSING Q3 (cm): 3.1 cm  BLESSING Q4 (cm): 5 cm  BLESSING TOTAL (cm): 16.6 cm    Biophysical Profile  Fetal Breathin  Fetal Movement: 2  Fetal Tone: 2  Fluid Volume: 2  Nonstress Test: 2  Biophysical Profile Score (of 8): 8 /8  Biophysical Profile Score (of 10): 10 /10    Ultrasound Other  Fetal Presentation: Vertex  Cervical Length: 4.12  Funnel: No  Debris: No  Placenta Location: Posterior  Placenta Previa: No    Interpretation  Nonstress Test Interpretation: Reactive  Overall Impression: Reassuring    Fredo Crowe MD

## 2025-06-20 NOTE — TELEPHONE ENCOUNTER
"REASON FOR CONVERSATION: Vaginal Bleeding - Pregnant    SYMPTOMS: Bright red vaginal bleeding x1    OTHER HEALTH INFORMATION:  31w  Has been spotting for 2 days     PROTOCOL DISPOSITION: Go to  Now    CARE ADVICE PROVIDED: ESC placed to on call provider who advised triage eval    PRACTICE FOLLOW-UP: N/A      Reason for Disposition   MILD-MODERATE vaginal bleeding (e.g., small to medium clots; like mild menstrual period)  (Exception: Single episode of faint spotting when wiping, or slight spotting after intercourse or pelvic exam.)    Answer Assessment - Initial Assessment Questions  1. ONSET: \"When did this bleeding start?\"         Has been going on for about 2 days     2. BLEEDING SEVERITY: \"Describe the bleeding that you are having.\" \"How much bleeding is there?\"       Bright red blood when wiping. Prior to this, it was a light pink but now it is darker.     3. ABDOMEN PAIN: \"Do you have any pain?\" \"How bad is the pain?\"  (e.g., Scale 0-10; none, mild, moderate, or severe)      Denies     4. PREGNANCY: \"Do you know how many weeks or months pregnant you are?\"       31w2d    5. JELANI: \"What date are you expecting to deliver?\"      25    6. FETAL MOVEMENT: \"Has the baby's movement decreased or changed significantly from normal?\"      Good fetal movement     7. HIGH-RISK PREGNANCY:  \"Have been told by your doctor that you have a high-risk condition that can cause bleeding?\"  (e.g., placenta previa, vasa previa)       Denies   No blood thinners     8. ULTRASOUND: \"Have you had an ultrasound during this pregnancy?\"  Note: To confirm intrauterine pregnancy, placenta location.      Yes - looked good      9. HEMODYNAMIC STATUS: \"Are you weak or feeling lightheaded?\" If Yes, ask: \"Can you stand and walk normally?\"       Denies     10. OTHER SYMPTOMS: \"What other symptoms are you having with the bleeding?\" (e.g., leaking fluid from vagina, contractions)        Denies  No intercourse/ no pelvic exam  No heavy " lifting    Protocols used: Pregnancy - Vaginal Bleeding Greater Than 20 Weeks [id number]

## 2025-06-20 NOTE — H&P
"Ob/Gyn History and Physical  Sandy Alcantar 35 y.o. female MRN: [id number]  Unit/Bed#:  TRIAGE 2- Encounter: [id number]    A/P. 35 y.o.  at 31w2d, here with vaginal bleeding.  Assessment & Plan  Vaginal bleeding in pregnancy, third trimester  Vaginal bleeding.  Small amount by history.    No evidence of PTL:  Rare UC on toco.  Cervix closed and unchanged on repeat exam.  CL > 3cm.    No previa.    No evidence of abruption on exam, ultrasound, or labwork.    Wet mount negative but many PMNs.    No blood on exam; only light pink-tinged mucus.  --> Discharge home with precautions.  --> Check urine GC/CT; she does not have to wait for this to be resulted.  --> Follow up with Dr. Barksdale 25 as scheduled.  31 weeks gestation of pregnancy  Fetal status reassuring.  Reactive NST, normal BLESSING.  BPP 10/10.  --> Fetal kick counts.    Fredo Crowe MD  25  -------------------------------------------------------------------------------------------------------  CC/    \"I had bleeding.\"    HPI/    Two days ago had some pinkish discharge on toilet tissue.    Thursday, was on her feet all day.  Awoke this morning at 0300 and went to bathroom, saw some red blood, as well as some in toilet bowl.    No UC.  No LoF.  (+)FM.    No trauma.  Last IC 2 weeks ago.  No tobacco or drug use.    Pregnancy complications/    Problem List[1]    Allergies/      Allergies as of 2025 - Reviewed 2025   Allergen Reaction Noted    Other Other (See Comments) 2016    Penicillins Hives 2025       Rx/    Medications Ordered Prior to Encounter[2]    PMH/    Past Medical History[3]    PSH/    Past Surgical History[4]    ObHx/    , expecting a baby girl named Jolie      OB History    Para Term  AB Living   5 3 3 0 1 3   SAB IAB Ectopic Multiple Live Births   1 0 0 0 3      # Outcome Date GA Lbr Denys/2nd Weight Sex Type Anes PTL Lv   5 Current            4 Term 05/15/17 40w5d  4026 g (8 lb " 14 oz) M Vag-Spont  N ELIZA   3 SAB 2013 7w0d             Birth Comments: miscarriage passed on own   2 Term 08/28/11 37w3d  3317 g (7 lb 5 oz) F Vag-Spont EPI N ELIZA      Complications: Polyhydramnios      Name: Colbey   1 Term 02/10/09 40w4d  3232 g (7 lb 2 oz) F Vag-Spont  N ELIZA      Name: Loreto      [facility]        GynHx/    There is no history of HSV or HIV.    SH/    She is in a relationship with Alexis.    She works as a hairdresser.    She does not use tobacco, alcohol, or illicit drugs.        Social History     Socioeconomic History    Marital status: /Civil Union     Spouse name: Not on file    Number of children: Not on file    Years of education: Not on file    Highest education level: Not on file   Occupational History    Not on file   Tobacco Use    Smoking status: Never    Smokeless tobacco: Never   Vaping Use    Vaping status: Never Used   Substance and Sexual Activity    Alcohol use: Never    Drug use: Never    Sexual activity: Yes     Partners: Male     Birth control/protection: None   Other Topics Concern    Not on file   Social History Narrative    Not on file     Social Drivers of Health     Financial Resource Strain: Not on file   Food Insecurity: No Food Insecurity (1/24/2025)    Nursing - Inadequate Food Risk Classification     Worried About Running Out of Food in the Last Year: Never true     Ran Out of Food in the Last Year: Never true     Ran Out of Food in the Last Year: Not on file   Transportation Needs: No Transportation Needs (1/24/2025)    PRAPARE - Transportation     Lack of Transportation (Medical): No     Lack of Transportation (Non-Medical): No   Physical Activity: Not on file   Stress: Not on file   Social Connections: Not on file   Intimate Partner Violence: Not on file   Housing Stability: Low Risk  (1/24/2025)    Housing Stability Vital Sign     Unable to Pay for Housing in the Last Year: No     Number of Times Moved in the Last Year: 0      Homeless in the Last Year: No       RoS/    Constitutional: Negative    CV: Negative    Pulm: Negative    GI: Negative    Urinary: Negative    Neuro: Negative    Musculoskeletal: Negative    O/  /60 (BP Location: Right arm)   Pulse 80   Resp 18   LMP 11/13/2024   SpO2 97%     Alert, comfortable, no acute distress    Regular rate and rhythm    Clear to auscultation bilaterally    Abdomen soft, nontender, nondistended.    Fundus nontender, size consistent with dates      Cephalic    Gyn/      Normal female external genitalia.      Vault well-estrogenized, well-supported.          No blood.        Scant amount of pink-tinged mucus        Wet mount: Many PMNs, otherwise negative.     Pelvis adequate/gynecoid.        Cervix:           Dilation: Closed         Effacement: 0%         Station: Ballotable  Consistency: Medium         Position: Posterior   Presentation: Vertex    FHR: 120 moderate variability.  (+) accels, no decels.  Reactive.  Clawson:  Rare    Ultrasound:      Single live, active IUP, cephalic    Posterior placenta.  No separation, no retroplacental clot.    BLESSING:      Q1: 4.45cm      Q2: 3.95cm      Q3: 3.05cm      Q4: 5.04cm      Total: 16.49cm    US BPP 8/8 (10/10 overall)    US CL 4.12cm (4.12, 4.26, 4.38)    Labs/    WBC 9.48    Hgb 10.0 (11.3 on 5/28)    Platelets 157 (170 on 5/28)      Fibrinogen 444      UA Trace blood, small LE/negative nitrite, 4-10 WBC, moderate epithelial cells    Prenatal labs/  Lab Results   Component Value Date    ABO A 01/28/2025    RH Positive 01/28/2025    HGB 10.0 (L) 06/20/2025     06/20/2025    RPR Non Reactive 05/28/2025    HEPCAB Non Reactive 01/28/2025    [id number] 97 05/28/2025          [1]   Patient Active Problem List  Diagnosis    Lipodystrophy    31 weeks gestation of pregnancy    Anxiety    Major depressive disorder, recurrent, moderate (HCC)    Seasonal allergic rhinitis    Spotting affecting pregnancy in second trimester    Uterine size-date  discrepancy in third trimester   [2]   No current facility-administered medications on file prior to encounter.     Current Outpatient Medications on File Prior to Encounter   Medication Sig Dispense Refill    Omeprazole Magnesium (PRILOSEC PO) Take by mouth      Prenatal MV & Min w/FA-DHA (PRENATAL GUMMIES PO) Take 1 tablet by mouth in the morning      acetaminophen (TYLENOL) 80 mg chewable tablet Chew 80 mg every 4 (four) hours as needed for mild pain      diphenhydrAMINE (BENADRYL) 25 mg capsule Take 25 mg by mouth every 6 (six) hours as needed for itching, allergies or sleep      Ferrous Sulfate (IRON PO) Take 1 tablet by mouth daily (Patient not taking: Reported on 4/14/2025)      ondansetron (ZOFRAN-ODT) 4 mg disintegrating tablet Take 1 tablet (4 mg total) by mouth every 8 (eight) hours as needed for nausea or vomiting 30 tablet 1    sertraline (Zoloft) 25 mg tablet Take 1 tablet (25 mg total) by mouth daily (Patient not taking: Reported on 3/17/2025) 30 tablet 11   [3]   Past Medical History:  Diagnosis Date    Abnormal Pap smear of cervix 2013    2 abnormal with normal after    Chlamydia     age 19 - treated    Depression     Endometriosis     symptoms - never had surgery - sfter IUD removed s/s dissapated    Migraine     as a teen    Varicella     had chicken pox   [4]   Past Surgical History:  Procedure Laterality Date    ABDOMINOPLASTY N/A 02/12/2019    Procedure: ABDOMINOPLASTY;  Surgeon: Arian Deutsch MD;  Location:  MAIN OR;  Service: Plastics    ADENOIDECTOMY

## 2025-06-20 NOTE — ASSESSMENT & PLAN NOTE
Vaginal bleeding.  Small amount by history.    No evidence of PTL:  Rare UC on toco.  Cervix closed and unchanged on repeat exam.  CL > 3cm.    No previa.    No evidence of abruption on exam, ultrasound, or labwork.    Wet mount negative but many PMNs.    No blood on exam; only light pink-tinged mucus.  --> Discharge home with precautions.  --> Check urine GC/CT; she does not have to wait for this to be resulted.  --> Follow up with Dr. Barksdale 6/23/25 as scheduled.

## 2025-06-23 ENCOUNTER — ROUTINE PRENATAL (OUTPATIENT)
Age: 35
End: 2025-06-23
Payer: COMMERCIAL

## 2025-06-23 VITALS — DIASTOLIC BLOOD PRESSURE: 64 MMHG | BODY MASS INDEX: 34.14 KG/M2 | SYSTOLIC BLOOD PRESSURE: 110 MMHG | WEIGHT: 174.8 LBS

## 2025-06-23 DIAGNOSIS — Z34.83 PRENATAL CARE, SUBSEQUENT PREGNANCY, THIRD TRIMESTER: Primary | ICD-10-CM

## 2025-06-23 PROCEDURE — 99213 OFFICE O/P EST LOW 20 MIN: CPT | Performed by: OBSTETRICS & GYNECOLOGY

## 2025-06-23 NOTE — PROGRESS NOTES
3rd Trimester Visit  Name: Sandy Alcantar      : 1990      MRN: [id number]  Encounter Provider: Lalitha Reza MD  Encounter Date: 2025   Encounter department: [facility] [place]    35 y.o.  at 31w5d presenting for routine OB visit at 31w5d.:  Assessment & Plan  Prenatal care, subsequent pregnancy, third trimester  TDAP next visit.   Growth 62%         Reviewed premature labor precautions and fetal kick counts.  Advised to continue medications and return in 2 weeks.    History of Present Illness     She reports she is overall doing well.  Recent triage visit reviewed- cultures negative from that visit.  Denies uterine contractions.  Denies vaginal bleeding or leaking of fluid.  Reports adequate fetal movement of at least 10 movements in 2 hours once daily.       Current Outpatient Medications   Medication Instructions    acetaminophen (TYLENOL) 80 mg, Every 4 hours PRN    diphenhydrAMINE (BENADRYL) 25 mg, Every 6 hours PRN    Ferrous Sulfate (IRON PO) 1 tablet, Oral, Daily    Omeprazole Magnesium (PRILOSEC PO) Take by mouth    ondansetron (ZOFRAN-ODT) 4 mg, Oral, Every 8 hours PRN    Prenatal MV & Min w/FA-DHA (PRENATAL GUMMIES PO) 1 tablet, Daily     Objective   /64   Wt 79.3 kg (174 lb 12.8 oz)   LMP 2024   BMI 34.14 kg/m²      BP: Blood Pressure: 110/64  Wt: 79.3 kg (174 lb 12.8 oz); Body mass index is 34.14 kg/m².; TWG=17.6 kg (38 lb 12.8 oz)  Fetal Heart Rate: 124; Fundal Height (cm): 32 cm

## 2025-06-23 NOTE — PROGRESS NOTES
PN visit  31w5d  28 week labs Completed  1hr GTT= 97  Tdap next visit  Delivery Consent previously signed   Breastpump order placed today     * After exertion reports having lower abdominal pain- hx: Abdominoplasty.     Denies Leaking of Fluid, contractions  +Fetal Movement

## 2025-06-24 ENCOUNTER — TELEPHONE (OUTPATIENT)
Age: 35
End: 2025-06-24

## 2025-06-24 LAB
DME PARACHUTE DELIVERY DATE REQUESTED: NORMAL
DME PARACHUTE ITEM DESCRIPTION: NORMAL
DME PARACHUTE ORDER STATUS: NORMAL
DME PARACHUTE SUPPLIER NAME: NORMAL
DME PARACHUTE SUPPLIER PHONE: NORMAL

## 2025-06-24 NOTE — TELEPHONE ENCOUNTER
Attempted to contact patient for 3rd Trimester Check-in Call. Pt unavailable. Fision msg was sent  6/20/25.  Left msg to reach out w/questions or concerns.

## 2025-07-03 ENCOUNTER — ROUTINE PRENATAL (OUTPATIENT)
Age: 35
End: 2025-07-03
Payer: COMMERCIAL

## 2025-07-03 VITALS — SYSTOLIC BLOOD PRESSURE: 100 MMHG | DIASTOLIC BLOOD PRESSURE: 70 MMHG | WEIGHT: 176.4 LBS | BODY MASS INDEX: 34.45 KG/M2

## 2025-07-03 DIAGNOSIS — O99.340 DEPRESSION AFFECTING PREGNANCY: ICD-10-CM

## 2025-07-03 DIAGNOSIS — Z34.83 PRENATAL CARE, SUBSEQUENT PREGNANCY, THIRD TRIMESTER: Primary | ICD-10-CM

## 2025-07-03 DIAGNOSIS — F32.A DEPRESSION AFFECTING PREGNANCY: ICD-10-CM

## 2025-07-03 PROCEDURE — 99213 OFFICE O/P EST LOW 20 MIN: CPT | Performed by: OBSTETRICS & GYNECOLOGY

## 2025-07-03 NOTE — PROGRESS NOTES
3rd Trimester Visit  Name: Sandy Alcantar      : 1990      MRN: [id number]  Encounter Provider: Lalitha Reza MD  Encounter Date: 7/3/2025   Encounter department: [facility] [place]    35 y.o.  at 33w1d presenting for routine OB visit at 33w1d.:  Assessment & Plan  Prenatal care, subsequent pregnancy, third trimester  Growth scan  - 62%, follow up as clinically indicated         Depression affecting pregnancy    Orders:    Ambulatory referral to Psych Services; Future    Reviewed premature labor precautions and fetal kick counts.  Advised to continue medications and return in 2 weeks.    Depression Screening Follow-up Plan: Patient's depression screening was positive with an [place]  Depression Scale score of 16. Patient assessed for underlying major depression. They have no active suicidal ideations. Brief counseling provided and recommend additional follow-up/re-evaluation next office visit.    Has used Zoloft in the past - had s/e from this.   Did do well with Wellbutrin in the past.  Would be okay to consider restarting if needed.  She is most worried about postpartum.   Open to referral for Baby & Me counseling.     History of Present Illness     She reports she is overall doing well.  Birth plan returned/reviewed..  Denies uterine contractions.  Denies vaginal bleeding or leaking of fluid.  Reports adequate fetal movement of at least 10 movements in 2 hours once daily.         Current Outpatient Medications   Medication Instructions    Omeprazole Magnesium (PRILOSEC PO) Take by mouth    Prenatal MV & Min w/FA-DHA (PRENATAL GUMMIES PO) 1 tablet, Daily     Objective   /70   Wt 80 kg (176 lb 6.4 oz)   LMP 2024   BMI 34.45 kg/m²      BP: Blood Pressure: 100/70  Wt: 80 kg (176 lb 6.4 oz); Body mass index is 34.45 kg/m².; TWG=18.3 kg (40 lb 6.4 oz)  Fetal Heart Rate: 150; Fundal Height (cm): 33 cm    Abdomen: soft, non-tender     [As Noted in HPI] : as noted in HPI [Negative] : Heme/Lymph

## 2025-07-03 NOTE — PROGRESS NOTES
PN visit    33w1d  Tdap next visit  Delivery Consent previously signed   Breastpump not received (needs to contact company)  Birth plan returned today    Denies loss of fluid, contractions or bleeding  +Fetal movement    EPDS: 16 -tried Zoloft in the past at night and was getting nausea and tired throughout the day.

## 2025-07-11 ENCOUNTER — NURSE TRIAGE (OUTPATIENT)
Age: 35
End: 2025-07-11

## 2025-07-11 ENCOUNTER — HOSPITAL ENCOUNTER (OUTPATIENT)
Facility: HOSPITAL | Age: 35
Discharge: HOME | End: 2025-07-11
Attending: OBSTETRICS & GYNECOLOGY | Admitting: OBSTETRICS & GYNECOLOGY
Payer: COMMERCIAL

## 2025-07-11 VITALS — SYSTOLIC BLOOD PRESSURE: 104 MMHG | TEMPERATURE: 98.3 F | HEART RATE: 109 BPM | DIASTOLIC BLOOD PRESSURE: 50 MMHG

## 2025-07-11 PROBLEM — O47.00 PRETERM UTERINE CONTRACTIONS: Status: ACTIVE | Noted: 2025-07-11

## 2025-07-11 PROBLEM — Z3A.34 34 WEEKS GESTATION OF PREGNANCY: Status: ACTIVE | Noted: 2025-02-12

## 2025-07-11 LAB — FIBRONECTIN FETAL VAG QL: NEGATIVE

## 2025-07-11 PROCEDURE — 82731 ASSAY OF FETAL FIBRONECTIN: CPT | Performed by: STUDENT IN AN ORGANIZED HEALTH CARE EDUCATION/TRAINING PROGRAM

## 2025-07-11 PROCEDURE — 59025 FETAL NON-STRESS TEST: CPT | Performed by: STUDENT IN AN ORGANIZED HEALTH CARE EDUCATION/TRAINING PROGRAM

## 2025-07-11 PROCEDURE — 99213 OFFICE O/P EST LOW 20 MIN: CPT

## 2025-07-11 PROCEDURE — 76815 OB US LIMITED FETUS(S): CPT | Performed by: STUDENT IN AN ORGANIZED HEALTH CARE EDUCATION/TRAINING PROGRAM

## 2025-07-11 PROCEDURE — 99213 OFFICE O/P EST LOW 20 MIN: CPT | Performed by: STUDENT IN AN ORGANIZED HEALTH CARE EDUCATION/TRAINING PROGRAM

## 2025-07-11 NOTE — PROCEDURES
Sandy Alcantar, angelito  at 34w2d with an JELANI of 2025, by Last Menstrual Period, was seen at [facility] for the following procedure(s): $Procedure Type: NST, BLESSING ( contractions)]    Nonstress Test  Variability: Moderate  Decelerations: None  Accelerations: Yes  Acoustic Stimulator: No  Baseline: 120 BPM  Uterine Irritability: No  Contractions: Irregular    4 Quadrant BLESSING  BLESSING Q1 (cm): 5.4 cm  BLESSING Q2 (cm): 2.2 cm  BLESSING Q3 (cm): 3.5 cm  BLESSING Q4 (cm): 5 cm  BLESSING TOTAL (cm): 16.1 cm     Reactive, reassuring. See triage H&P for details of evaluation.        John Singer MD  2025 5:19 PM

## 2025-07-11 NOTE — TELEPHONE ENCOUNTER
"  REASON FOR CONVERSATION: No chief complaint on file.    SYMPTOMS: 34 weeks 2 days EDC 25.   Patient states she is having contractions for about an hour q 5 minutes, lasting about 1/.5 minutes, sharp but not yet severe, pain  about 4/10 but some stronger.   No lof, no vag bleeding. Baby moving well. She has been drinking well, does not feel dehydrated. No HA, no swelling, non/v/d. No fever.     OTHER HEALTH INFORMATION: last office visit      PROTOCOL DISPOSITION: Go to  Now    CARE ADVICE PROVIDED: call back any new concerns( may not be able to use 911 may need to call local EMS if any urgent need- recent [place] Emergency Alert) patient verbalzied understanding.    PRACTICE FOLLOW-UP: ESC on call Dr. Nitin Santamaria and  L&D Charge RN       Reason for Disposition   Contractions < 10 minutes apart for 1 hour (i.e., 6 or more contractions an hour)    Answer Assessment - Initial Assessment Questions  1. ONSET: \"When did the symptoms begin?\"         An hour ago   2. CONTRACTIONS: \"Describe the contractions that you are having.\" (e.g., duration, frequency, regularity, severity)      Every 5 minutes, sharp   3. PREGNANCY: \"How many weeks pregnant are you?\"      34 weeks 2 days   4. JELANI: \"What date are you expecting to deliver?\"      25   5. PARITY: \"Have you had a baby before?\" If Yes, ask: \"How long did the labor last?\"        6. FETAL MOVEMENT: \"Has the baby's movement decreased or changed significantly from normal?\"      Moving well   7. OTHER SYMPTOMS: \"Do you have any other symptoms?\" (e.g., leaking fluid from vagina, fever, hand/facial swelling)      Denies    Protocols used: Pregnancy - Labor - -Adult-[place]    "

## 2025-07-11 NOTE — ASSESSMENT & PLAN NOTE
- Cervical exam remained 1-2 cm dilated, long, and ballotable over 3 hours. Fetal fibronectin sent and negative. No evidence of  labor.   - Clinically stable for discharge to home.   - Routine labor, ROM, bleeding precautions given. Kick counts reviewed.   - May resume routine prenatal care.

## 2025-07-11 NOTE — H&P
H&P - OB/GYN   Name: Sandy Alcantar 35 y.o. female I MRN: [id number]  Unit/Bed#: LD TRIAGE 2-01 I Date of Admission: 2025   Date of Service: 2025 I Hospital Day: 0     Assessment & Plan   uterine contractions  - Cervical exam remained 1-2 cm dilated, long, and ballotable over 3 hours. Fetal fibronectin sent and negative. No evidence of  labor.   - Clinically stable for discharge to home.   - Routine labor, ROM, bleeding precautions given. Kick counts reviewed.   - May resume routine prenatal care.     History of Present Illness   Patient of: [facility] [place]  Brief Summary: Sandy Alcantar  with an JELANI of 2025, by Last Menstrual Period at 34w2d presenting with contractions.     Chief Complaint: Contractions    HPI:  She reports irregular, mild contractions throughout the day. These have become increasingly frequent and are as close as every 5 minutes. Denies leaking fluid or bleeding. She reports normal fetal movement.     Pregnancy complications: none.     Review of Systems   All other systems reviewed and are negative.      Historical Information   Historical Information   Past Medical History[1]  Past Surgical History[2]  Social History[3]  E-Cigarette/Vaping    E-Cigarette Use Never User      E-Cigarette/Vaping Substances     Family history non-contributory  Social History[4]  Prior to Admission Medications   Prescriptions Last Dose Informant Patient Reported? Taking?   Omeprazole Magnesium (PRILOSEC PO)  Self Yes No   Sig: Take by mouth   Prenatal MV & Min w/FA-DHA (PRENATAL GUMMIES PO)  Self Yes No   Sig: Take 1 tablet by mouth in the morning      Facility-Administered Medications: None     Other and Penicillins  OB History    Para Term  AB Living   5 3 3  1 3   SAB IAB Ectopic Multiple Live Births   1    3      # Outcome Date GA Lbr Denys/2nd Weight Sex Type Anes PTL Lv   5 Current            4 Term 05/15/17 40w5d  4026 g (8 lb 14 oz) M  "Vag-Spont  N ELIZA   3 SAB 2013 7w0d             Birth Comments: miscarriage passed on own   2 Term 08/28/11 37w3d  3317 g (7 lb 5 oz) F Vag-Spont EPI N ELIZA      Complications: Polyhydramnios   1 Term 02/10/09 40w4d  3232 g (7 lb 2 oz) F Vag-Spont  N ELIZA      Obstetric Comments   [facility]      Baby complications/comments: None    Objective :  HR:  [109] 109  BP: (104)/(50) 104/50    Physical Exam  Vitals reviewed. Exam conducted with a chaperone present (Anna Donnelly RN).   HENT:      Head: Normocephalic.      Mouth/Throat:      Mouth: Mucous membranes are moist.     Cardiovascular:      Rate and Rhythm: Normal rate.   Pulmonary:      Effort: Pulmonary effort is normal.   Abdominal:      Palpations: Abdomen is soft.      Tenderness: There is no abdominal tenderness. There is no guarding or rebound.      Comments: Gravid     Musculoskeletal:      Cervical back: Normal range of motion.        Cervical Exam   1-2/long/high  Contractions:  Contraction Frequency (minutes): occasional  Contraction Duration (seconds):   Contraction Intensity: Mild  Fetal heart rate  Baseline Rate (FHR): 130 bpm  Variability: Moderate  Accelerations: 15 x 15 or greater  Decelerations: None    Ultrasound:   Vertex presentation confirmed    4 Quadrant BLESSING  BLESSING Q1 (cm): 5.4 cm  BLESSING Q2 (cm): 2.2 cm  BLESSING Q3 (cm): 3.5 cm  BLESSING Q4 (cm): 5 cm  BLESSING TOTAL (cm): 16.1 cm      Lab Results: I have reviewed the following results:  No results found for: \"STREPGRPB\"  No results found for: \"STREPGPB\"  RPR   Date Value Ref Range Status   05/28/2025 Non Reactive Non Reactive Final     No results found for: \"HEPBSAG\"  No components found for: \"EXTHEPBSAG\"  HEP C AB   Date Value Ref Range Status   01/28/2025 Non Reactive Non Reactive Final     No results found for: \"RUBELLAIGGQT\"  No results found for: \"EXTRUBELIGGQ\"  No results found for: \"HIVAGAB\"  No components found for: \"QOENWI8FZ\"  N gonorrhoeae, DNA Probe   Date Value Ref Range Status " "  06/20/2025 Negative Negative Final     Chlamydia trachomatis, DNA Probe   Date Value Ref Range Status   06/20/2025 Negative Negative Final       Type & Screen:  ABO Grouping   Date Value Ref Range Status   01/28/2025 A  Final     Rh Type   Date Value Ref Range Status   01/28/2025 Positive  Final     Comment:     Please note: Prior records for this patient's ABO / Rh type are not  available for additional verification.       No results found for: \"ANTIBODYSCR\"  No results found for: \"SPECIMEXP\"             [1]   Past Medical History:  Diagnosis Date    Abnormal Pap smear of cervix 2013    2 abnormal with normal after    Chlamydia     age 19 - treated    Depression     Endometriosis     symptoms - never had surgery - sfter IUD removed s/s dissapated    Migraine     as a teen    Varicella     had chicken pox   [2]   Past Surgical History:  Procedure Laterality Date    ABDOMINOPLASTY N/A 02/12/2019    Procedure: ABDOMINOPLASTY;  Surgeon: Arian Deutsch MD;  Location: [facility] OR;  Service: Plastics    ADENOIDECTOMY     [3]   Social History  Tobacco Use    Smoking status: Never    Smokeless tobacco: Never   Vaping Use    Vaping status: Never Used   Substance and Sexual Activity    Alcohol use: Never    Drug use: Never    Sexual activity: Yes     Partners: Male     Birth control/protection: None   [4]   Social History  Tobacco Use    Smoking status: Never    Smokeless tobacco: Never   Vaping Use    Vaping status: Never Used   Substance and Sexual Activity    Alcohol use: Never    Drug use: Never    Sexual activity: Yes     Partners: Male     Birth control/protection: None     "

## 2025-07-16 ENCOUNTER — ROUTINE PRENATAL (OUTPATIENT)
Age: 35
End: 2025-07-16
Payer: COMMERCIAL

## 2025-07-16 VITALS — WEIGHT: 176.6 LBS | DIASTOLIC BLOOD PRESSURE: 56 MMHG | BODY MASS INDEX: 34.49 KG/M2 | SYSTOLIC BLOOD PRESSURE: 96 MMHG

## 2025-07-16 DIAGNOSIS — Z23 NEED FOR TDAP VACCINATION: ICD-10-CM

## 2025-07-16 DIAGNOSIS — Z34.83 PRENATAL CARE, SUBSEQUENT PREGNANCY, THIRD TRIMESTER: Primary | ICD-10-CM

## 2025-07-16 PROCEDURE — 90471 IMMUNIZATION ADMIN: CPT

## 2025-07-16 PROCEDURE — 99213 OFFICE O/P EST LOW 20 MIN: CPT | Performed by: OBSTETRICS & GYNECOLOGY

## 2025-07-16 PROCEDURE — 90715 TDAP VACCINE 7 YRS/> IM: CPT

## 2025-07-16 NOTE — PROGRESS NOTES
PN visit  35wk  L&D visit 7/11/25; contractions every 5 minutes for an hour. NST/BLESSING. She had ctx for 3 days afterwards  Birth plan in chart  Breast pump ordered  Tdap administered in left deltoid; tolerated well. VIS given  Delivery consent previously signed  Cramping, spotting or loss of fluid; denies  + fetal movement

## 2025-07-16 NOTE — PROGRESS NOTES
Pt reports +FM and occa BH ctx.  Denies LOF or VB.  Passed mucous plug the other day.    GBS NV.      Last u/s 6/13 @ 30wks.  EFW-62%, 1679g.  Discussed IOL @ 39wks.     PTL, wt gain, ROM, diet and hydration reviewed.

## 2025-07-21 ENCOUNTER — PATIENT MESSAGE (OUTPATIENT)
Age: 35
End: 2025-07-21

## 2025-07-22 ENCOUNTER — NURSE TRIAGE (OUTPATIENT)
Age: 35
End: 2025-07-22

## 2025-07-22 NOTE — TELEPHONE ENCOUNTER
Regardinw, felt like passing out  ----- Message from Edith GRANDE sent at 2025  8:17 AM EDT -----  Pt sent a myc message that while working yesterday she had another episode of nearly passing out. She said she was on her feet all day ( she is a ) and she thinks that is why.

## 2025-07-22 NOTE — TELEPHONE ENCOUNTER
"REASON FOR CONVERSATION: Pregnancy Problem    SYMPTOMS: dizziness, lightheaded, tunnel vision, lower extremity weakness, nausea/vomited x one yesterday. Symptoms resolved within 30 min.     OTHER HEALTH INFORMATION: Symptoms occurred yesterday afternoon while working. Improved with drinking water    PROTOCOL DISPOSITION: See Today in Office    CARE ADVICE PROVIDED: Advised good hydration today, small frequent meals.  Call back if symptoms return or has additional concerns. Keep f/u appt as scheduled for 7/20    PRACTICE FOLLOW-UP: IB message sent on call provider for additional recommendations.       Reason for Disposition   Vomiting occurs with dizziness    Answer Assessment - Initial Assessment Questions  1. DESCRIPTION: \"Describe your dizziness.\"      Starts with nausea, then dizzy feeling, tunnel vision, cold sweats, vomited x 1deni  2. LIGHTHEADED: \"Do you feel lightheaded?\" (e.g., somewhat faint, woozy, weak upon standing)      Feels weak in legs like going to pass out  3. VERTIGO: \"Do you feel like either you or the room is spinning or tilting?\" (i.e., vertigo)      denies  4. SEVERITY: \"How bad is it?\"  \"Do you feel like you are going to faint?\" \"Can you stand and walk?\"      Alden ruthy x 1/2 hour  5. ONSET:  \"When did the dizziness begin?\"      Yesterday   6. AGGRAVATING FACTORS: \"Does anything make it worse?\" (e.g., standing, change in head position)      Heat, physical activity  7. HEART RATE: \"Can you tell me your heart rate?\" \"How many beats in 15 seconds?\"  (Note: Not all patients can do this.)        Denies heart racing or shortness of breath  8. CAUSE: \"What do you think is causing the dizziness?\" (e.g., decreased fluids or food, diarrhea, emotional distress, heat exposure, new medicine, sudden standing, vomiting; unknown)      Unsure-may be dehydration, working with arms elevated x 3 hours  9. RECURRENT SYMPTOM: \"Have you had dizziness before?\" If Yes, ask: \"When was the last time?\" \"What happened " "that time?\"      In beginning of pregnancy   10. OTHER SYMPTOMS: \"Do you have any other symptoms?\" (e.g., fever, chest pain, vomiting, diarrhea, bleeding)        Vomited x 1 yesterday at time of occurence  11. PREGNANCY: \"Is there any chance you are pregnant?\" \"When was your last menstrual period?\"        35w6d    Protocols used: Dizziness-Adult-[place]    "

## 2025-07-29 ENCOUNTER — ROUTINE PRENATAL (OUTPATIENT)
Age: 35
End: 2025-07-29
Payer: COMMERCIAL

## 2025-07-29 VITALS — DIASTOLIC BLOOD PRESSURE: 64 MMHG | SYSTOLIC BLOOD PRESSURE: 92 MMHG | WEIGHT: 180.4 LBS | BODY MASS INDEX: 35.23 KG/M2

## 2025-07-29 DIAGNOSIS — Z34.83 PRENATAL CARE, SUBSEQUENT PREGNANCY, THIRD TRIMESTER: Primary | ICD-10-CM

## 2025-07-29 PROCEDURE — 99213 OFFICE O/P EST LOW 20 MIN: CPT | Performed by: OBSTETRICS & GYNECOLOGY

## 2025-07-29 RX ORDER — BUPROPION HYDROCHLORIDE 100 MG/1
TABLET, EXTENDED RELEASE ORAL EVERY 24 HOURS
COMMUNITY

## 2025-07-31 LAB — GP B STREP DNA SPEC QL NAA+PROBE: NEGATIVE

## 2025-08-05 ENCOUNTER — HOSPITAL ENCOUNTER (OUTPATIENT)
Facility: HOSPITAL | Age: 35
Discharge: HOME | End: 2025-08-05
Attending: STUDENT IN AN ORGANIZED HEALTH CARE EDUCATION/TRAINING PROGRAM | Admitting: STUDENT IN AN ORGANIZED HEALTH CARE EDUCATION/TRAINING PROGRAM
Payer: COMMERCIAL

## 2025-08-05 ENCOUNTER — ROUTINE PRENATAL (OUTPATIENT)
Age: 35
End: 2025-08-05
Payer: COMMERCIAL

## 2025-08-05 VITALS
WEIGHT: 182.2 LBS | SYSTOLIC BLOOD PRESSURE: 98 MMHG | BODY MASS INDEX: 35.58 KG/M2 | HEART RATE: 106 BPM | DIASTOLIC BLOOD PRESSURE: 64 MMHG

## 2025-08-05 VITALS
HEART RATE: 85 BPM | TEMPERATURE: 98.5 F | SYSTOLIC BLOOD PRESSURE: 110 MMHG | RESPIRATION RATE: 18 BRPM | OXYGEN SATURATION: 98 % | DIASTOLIC BLOOD PRESSURE: 57 MMHG

## 2025-08-05 DIAGNOSIS — Z34.83 ENCOUNTER FOR SUPERVISION OF OTHER NORMAL PREGNANCY IN THIRD TRIMESTER: Primary | ICD-10-CM

## 2025-08-05 PROBLEM — Z34.90: Status: ACTIVE | Noted: 2025-08-05

## 2025-08-05 PROCEDURE — 99213 OFFICE O/P EST LOW 20 MIN: CPT | Performed by: OBSTETRICS & GYNECOLOGY

## 2025-08-05 PROCEDURE — NC001 PR NO CHARGE: Performed by: OBSTETRICS & GYNECOLOGY

## 2025-08-05 PROCEDURE — 99212 OFFICE O/P EST SF 10 MIN: CPT

## 2025-08-05 PROCEDURE — 76815 OB US LIMITED FETUS(S): CPT | Performed by: OBSTETRICS & GYNECOLOGY

## 2025-08-05 PROCEDURE — 59025 FETAL NON-STRESS TEST: CPT | Performed by: OBSTETRICS & GYNECOLOGY

## 2025-08-07 ENCOUNTER — TELEPHONE (OUTPATIENT)
Dept: OBGYN CLINIC | Facility: CLINIC | Age: 35
End: 2025-08-07

## 2025-08-11 ENCOUNTER — NURSE TRIAGE (OUTPATIENT)
Age: 35
End: 2025-08-11

## 2025-08-11 ENCOUNTER — ANESTHESIA EVENT (INPATIENT)
Dept: ANESTHESIOLOGY | Facility: HOSPITAL | Age: 35
DRG: 560 | End: 2025-08-11
Payer: COMMERCIAL

## 2025-08-11 ENCOUNTER — ROUTINE PRENATAL (OUTPATIENT)
Age: 35
End: 2025-08-11
Payer: COMMERCIAL

## 2025-08-11 ENCOUNTER — ANESTHESIA (INPATIENT)
Dept: ANESTHESIOLOGY | Facility: HOSPITAL | Age: 35
DRG: 560 | End: 2025-08-11
Payer: COMMERCIAL

## 2025-08-11 ENCOUNTER — HOSPITAL ENCOUNTER (INPATIENT)
Facility: HOSPITAL | Age: 35
LOS: 2 days | Discharge: HOME | DRG: 560 | End: 2025-08-13
Attending: OBSTETRICS & GYNECOLOGY | Admitting: OBSTETRICS & GYNECOLOGY
Payer: COMMERCIAL

## 2025-08-11 ENCOUNTER — NURSE TRIAGE (OUTPATIENT)
Dept: OTHER | Facility: OTHER | Age: 35
End: 2025-08-11

## 2025-08-11 ENCOUNTER — HOSPITAL ENCOUNTER (OUTPATIENT)
Facility: HOSPITAL | Age: 35
Discharge: HOME | DRG: 560 | End: 2025-08-11
Attending: OBSTETRICS & GYNECOLOGY | Admitting: OBSTETRICS & GYNECOLOGY
Payer: COMMERCIAL

## 2025-08-11 VITALS — OXYGEN SATURATION: 98 % | RESPIRATION RATE: 18 BRPM | TEMPERATURE: 97.8 F

## 2025-08-11 PROBLEM — N93.9 VAGINAL BLEEDING: Status: ACTIVE | Noted: 2025-05-05

## 2025-08-11 PROBLEM — O47.9 UTERINE CONTRACTIONS: Status: ACTIVE | Noted: 2025-08-11

## 2025-08-11 PROCEDURE — NC001 PR NO CHARGE: Performed by: OBSTETRICS & GYNECOLOGY

## 2025-08-11 PROCEDURE — 59025 FETAL NON-STRESS TEST: CPT | Performed by: OBSTETRICS & GYNECOLOGY

## 2025-08-11 PROCEDURE — 99213 OFFICE O/P EST LOW 20 MIN: CPT

## 2025-08-14 ENCOUNTER — TELEPHONE (OUTPATIENT)
Age: 35
End: 2025-08-14

## 2025-08-14 LAB
DME PARACHUTE DELIVERY DATE ACTUAL: NORMAL
DME PARACHUTE DELIVERY DATE REQUESTED: NORMAL
DME PARACHUTE ITEM DESCRIPTION: NORMAL
DME PARACHUTE ORDER STATUS: NORMAL
DME PARACHUTE SUPPLIER NAME: NORMAL
DME PARACHUTE SUPPLIER PHONE: NORMAL

## 2025-08-15 ENCOUNTER — TELEPHONE (OUTPATIENT)
Age: 35
End: 2025-08-15

## (undated) DEVICE — DRESSING XEROFORM 5 X 9

## (undated) DEVICE — SUT VICRYL 4-0 PS-2 18 IN J496G

## (undated) DEVICE — TRAY FOLEY 16FR URIMETER SURESTEP

## (undated) DEVICE — STERILE POLYISOPRENE POWDER-FREE SURGICAL GLOVES: Brand: PROTEXIS

## (undated) DEVICE — TUBING ASPIRATION LIPOSUCTION SET 12FT

## (undated) DEVICE — TABLE COVER: Brand: CONVERTORS

## (undated) DEVICE — INTENDED FOR TISSUE SEPARATION, AND OTHER PROCEDURES THAT REQUIRE A SHARP SURGICAL BLADE TO PUNCTURE OR CUT.: Brand: BARD-PARKER ® SAFETYLOCK CARBON RIB-BACK BLADES

## (undated) DEVICE — SPONGE STICK WITH PVP-I: Brand: KENDALL

## (undated) DEVICE — TUBING INFILTRATION 9FT

## (undated) DEVICE — SUT ETHILON 3-0 PS-1 18 IN 1663H

## (undated) DEVICE — CANNULA 3MM MERCEDES 22CM 8MM PORT

## (undated) DEVICE — PENCIL SMOKE EVAC TELESCOPING W/TUBING

## (undated) DEVICE — SUPER SPONGES,MEDIUM: Brand: DERMACEA

## (undated) DEVICE — SPONGE LAP STERILE 18X18

## (undated) DEVICE — CHEST/BREAST DRAPE: Brand: CONVERTORS

## (undated) DEVICE — NEEDLE 25G X 1 1/2

## (undated) DEVICE — SUT ETHILON 5-0 PS-2 18 IN 1666H

## (undated) DEVICE — PAD GROUNDING ADULT

## (undated) DEVICE — NEEDLE FILTER 5 MICR 19G X 1.5IN

## (undated) DEVICE — DRAPE SHEET THREE QUARTER

## (undated) DEVICE — DRAPE TOWEL: Brand: CONVERTORS

## (undated) DEVICE — SUT VICRYL 2-0 J459H

## (undated) DEVICE — SKIN MARKER DUAL TIP WITH RULER CAP, FLEXIBLE RULER AND LABELS: Brand: DEVON

## (undated) DEVICE — 3M™ STERI-STRIP™ REINFORCED ADHESIVE SKIN CLOSURES, R1547, 1/2 IN X 4 IN (12 MM X 100 MM), 6 STRIPS/ENVELOPE: Brand: 3M™ STERI-STRIP™

## (undated) DEVICE — 4-PORT MANIFOLD: Brand: NEPTUNE 2

## (undated) DEVICE — NEEDLE BLUNT 18 G X 1 1/2IN

## (undated) DEVICE — FILTER ABSOLUTE BIOFILTER 0.2MICRON F/LIPOSUCTION

## (undated) DEVICE — COTTON TIP APPLICTOR 2 PK

## (undated) DEVICE — KERLIX BANDAGE ROLL: Brand: KERLIX

## (undated) DEVICE — STANDARD SURGICAL GOWN, L: Brand: CONVERTORS

## (undated) DEVICE — CANNISTER WASTE IMPLOSION PROOF

## (undated) DEVICE — INTENDED FOR TISSUE SEPARATION, AND OTHER PROCEDURES THAT REQUIRE A SHARP SURGICAL BLADE TO PUNCTURE OR CUT.: Brand: BARD-PARKER SAFETY BLADES SIZE 11, STERILE

## (undated) DEVICE — SYRINGE 3ML LL

## (undated) DEVICE — SUT PROLENE 3-0 PC-5 18 IN 8632G

## (undated) DEVICE — BASIC PACK: Brand: CONVERTORS

## (undated) DEVICE — GLOVE SURG DERMASSURE LF 6.5

## (undated) DEVICE — JACKSON-PRATT 100CC BULB RESERVOIR: Brand: CARDINAL HEALTH

## (undated) DEVICE — DRAIN CHANNEL RND FULL FLUTED 10FR W/TROCAR

## (undated) DEVICE — SUT ETHIBOND 0 CT-2 30 IN X412H

## (undated) DEVICE — SYRINGE 10ML LL CONTROL TOP

## (undated) DEVICE — NDL CNTR 20CT FM MAG: Brand: MEDLINE INDUSTRIES, INC.

## (undated) DEVICE — SMOKE EVACUATION TUBING WITH 7/8" HOSE TO HOSE CONNECTOR: Brand: BUFFALO FILTER

## (undated) DEVICE — READY WET SKIN SCRUB TRAY-LF: Brand: MEDLINE INDUSTRIES, INC.